# Patient Record
Sex: FEMALE | Race: OTHER | NOT HISPANIC OR LATINO | URBAN - METROPOLITAN AREA
[De-identification: names, ages, dates, MRNs, and addresses within clinical notes are randomized per-mention and may not be internally consistent; named-entity substitution may affect disease eponyms.]

---

## 2018-03-20 ENCOUNTER — EMERGENCY (EMERGENCY)
Facility: HOSPITAL | Age: 53
LOS: 1 days | Discharge: HOME | End: 2018-03-20
Attending: EMERGENCY MEDICINE
Payer: MEDICAID

## 2018-03-20 VITALS
SYSTOLIC BLOOD PRESSURE: 133 MMHG | HEART RATE: 84 BPM | OXYGEN SATURATION: 99 % | DIASTOLIC BLOOD PRESSURE: 83 MMHG | RESPIRATION RATE: 17 BRPM

## 2018-03-20 VITALS
HEIGHT: 65 IN | HEART RATE: 95 BPM | OXYGEN SATURATION: 99 % | SYSTOLIC BLOOD PRESSURE: 146 MMHG | WEIGHT: 240.08 LBS | DIASTOLIC BLOOD PRESSURE: 88 MMHG | TEMPERATURE: 97 F

## 2018-03-20 LAB
ALBUMIN SERPL ELPH-MCNC: 3 G/DL — LOW (ref 3.5–5)
ALP SERPL-CCNC: 130 U/L — HIGH (ref 40–120)
ALT FLD-CCNC: 18 U/L DA — SIGNIFICANT CHANGE UP (ref 10–60)
ANION GAP SERPL CALC-SCNC: 8 MMOL/L — SIGNIFICANT CHANGE UP (ref 5–17)
AST SERPL-CCNC: 18 U/L — SIGNIFICANT CHANGE UP (ref 10–40)
BASOPHILS # BLD AUTO: 0.1 K/UL — SIGNIFICANT CHANGE UP (ref 0–0.2)
BASOPHILS NFR BLD AUTO: 1.1 % — SIGNIFICANT CHANGE UP (ref 0–2)
BILIRUB SERPL-MCNC: 0.2 MG/DL — SIGNIFICANT CHANGE UP (ref 0.2–1.2)
BUN SERPL-MCNC: 5 MG/DL — LOW (ref 7–18)
CALCIUM SERPL-MCNC: 9.3 MG/DL — SIGNIFICANT CHANGE UP (ref 8.4–10.5)
CHLORIDE SERPL-SCNC: 107 MMOL/L — SIGNIFICANT CHANGE UP (ref 96–108)
CK SERPL-CCNC: 59 U/L — SIGNIFICANT CHANGE UP (ref 21–215)
CO2 SERPL-SCNC: 25 MMOL/L — SIGNIFICANT CHANGE UP (ref 22–31)
CREAT SERPL-MCNC: 0.67 MG/DL — SIGNIFICANT CHANGE UP (ref 0.5–1.3)
EOSINOPHIL # BLD AUTO: 0.2 K/UL — SIGNIFICANT CHANGE UP (ref 0–0.5)
EOSINOPHIL NFR BLD AUTO: 1.6 % — SIGNIFICANT CHANGE UP (ref 0–6)
GLUCOSE SERPL-MCNC: 95 MG/DL — SIGNIFICANT CHANGE UP (ref 70–99)
HCG SERPL-ACNC: 3 MIU/ML — SIGNIFICANT CHANGE UP
HCT VFR BLD CALC: 42.9 % — SIGNIFICANT CHANGE UP (ref 34.5–45)
HGB BLD-MCNC: 13.1 G/DL — SIGNIFICANT CHANGE UP (ref 11.5–15.5)
LYMPHOCYTES # BLD AUTO: 29.2 % — SIGNIFICANT CHANGE UP (ref 13–44)
LYMPHOCYTES # BLD AUTO: 3.7 K/UL — HIGH (ref 1–3.3)
MCHC RBC-ENTMCNC: 29 PG — SIGNIFICANT CHANGE UP (ref 27–34)
MCHC RBC-ENTMCNC: 30.5 GM/DL — LOW (ref 32–36)
MCV RBC AUTO: 95.2 FL — SIGNIFICANT CHANGE UP (ref 80–100)
MONOCYTES # BLD AUTO: 0.6 K/UL — SIGNIFICANT CHANGE UP (ref 0–0.9)
MONOCYTES NFR BLD AUTO: 4.4 % — SIGNIFICANT CHANGE UP (ref 2–14)
NEUTROPHILS # BLD AUTO: 8.1 K/UL — HIGH (ref 1.8–7.4)
NEUTROPHILS NFR BLD AUTO: 63.6 % — SIGNIFICANT CHANGE UP (ref 43–77)
PLATELET # BLD AUTO: 344 K/UL — SIGNIFICANT CHANGE UP (ref 150–400)
POTASSIUM SERPL-MCNC: 4.1 MMOL/L — SIGNIFICANT CHANGE UP (ref 3.5–5.3)
POTASSIUM SERPL-SCNC: 4.1 MMOL/L — SIGNIFICANT CHANGE UP (ref 3.5–5.3)
PROT SERPL-MCNC: 8.2 G/DL — SIGNIFICANT CHANGE UP (ref 6–8.3)
RBC # BLD: 4.51 M/UL — SIGNIFICANT CHANGE UP (ref 3.8–5.2)
RBC # FLD: 14.4 % — SIGNIFICANT CHANGE UP (ref 10.3–14.5)
SODIUM SERPL-SCNC: 140 MMOL/L — SIGNIFICANT CHANGE UP (ref 135–145)
TROPONIN I SERPL-MCNC: <0.015 NG/ML — SIGNIFICANT CHANGE UP (ref 0–0.04)
WBC # BLD: 12.7 K/UL — HIGH (ref 3.8–10.5)
WBC # FLD AUTO: 12.7 K/UL — HIGH (ref 3.8–10.5)

## 2018-03-20 PROCEDURE — 71046 X-RAY EXAM CHEST 2 VIEWS: CPT | Mod: 26

## 2018-03-20 PROCEDURE — 84484 ASSAY OF TROPONIN QUANT: CPT

## 2018-03-20 PROCEDURE — 93005 ELECTROCARDIOGRAM TRACING: CPT

## 2018-03-20 PROCEDURE — 70450 CT HEAD/BRAIN W/O DYE: CPT

## 2018-03-20 PROCEDURE — 85027 COMPLETE CBC AUTOMATED: CPT

## 2018-03-20 PROCEDURE — 82962 GLUCOSE BLOOD TEST: CPT

## 2018-03-20 PROCEDURE — 96375 TX/PRO/DX INJ NEW DRUG ADDON: CPT

## 2018-03-20 PROCEDURE — 99284 EMERGENCY DEPT VISIT MOD MDM: CPT | Mod: 25

## 2018-03-20 PROCEDURE — 82550 ASSAY OF CK (CPK): CPT

## 2018-03-20 PROCEDURE — 96374 THER/PROPH/DIAG INJ IV PUSH: CPT

## 2018-03-20 PROCEDURE — 70450 CT HEAD/BRAIN W/O DYE: CPT | Mod: 26

## 2018-03-20 PROCEDURE — 84702 CHORIONIC GONADOTROPIN TEST: CPT

## 2018-03-20 PROCEDURE — 71046 X-RAY EXAM CHEST 2 VIEWS: CPT

## 2018-03-20 PROCEDURE — 80053 COMPREHEN METABOLIC PANEL: CPT

## 2018-03-20 PROCEDURE — 99285 EMERGENCY DEPT VISIT HI MDM: CPT

## 2018-03-20 RX ORDER — DIPHENHYDRAMINE HCL 50 MG
25 CAPSULE ORAL ONCE
Qty: 0 | Refills: 0 | Status: COMPLETED | OUTPATIENT
Start: 2018-03-20 | End: 2018-03-20

## 2018-03-20 RX ORDER — SODIUM CHLORIDE 9 MG/ML
1000 INJECTION INTRAMUSCULAR; INTRAVENOUS; SUBCUTANEOUS ONCE
Qty: 0 | Refills: 0 | Status: COMPLETED | OUTPATIENT
Start: 2018-03-20 | End: 2018-03-20

## 2018-03-20 RX ORDER — METOCLOPRAMIDE HCL 10 MG
10 TABLET ORAL ONCE
Qty: 0 | Refills: 0 | Status: COMPLETED | OUTPATIENT
Start: 2018-03-20 | End: 2018-03-20

## 2018-03-20 RX ADMIN — SODIUM CHLORIDE 1000 MILLILITER(S): 9 INJECTION INTRAMUSCULAR; INTRAVENOUS; SUBCUTANEOUS at 13:26

## 2018-03-20 RX ADMIN — Medication 104 MILLIGRAM(S): at 13:25

## 2018-03-20 RX ADMIN — Medication 25 MILLIGRAM(S): at 13:26

## 2018-03-20 NOTE — ED PROVIDER NOTE - MEDICAL DECISION MAKING DETAILS
53 y/o F pt with dizziness and syncopal episode. Will obtain labs, CXR, EKG, and reassess. 53 y/o F pt with dizziness and syncopal episode. Will obtain labs, CXR, EKG, and reassess.  labs normal. headache resolved with treatment. now asymptomatic. home with cardiology and neurology followup. dizziness and fainting possibly prodrome to migraine. now all symptoms resolved after reglan benadryl

## 2018-03-20 NOTE — ED ADULT NURSE NOTE - OBJECTIVE STATEMENT
arrived ambulatory reports that suddenly felt dizzy and then Syncopased  denies C/P dizziness palpitation at present c/o H/A 6/10, attached to wall monitor.

## 2018-03-20 NOTE — ED PROVIDER NOTE - OBJECTIVE STATEMENT
51 y/o F pt with PMHx of anemia, asthma, breast CA, gastritis, and h/o "rapid heartbeat" presents to ED c/o lightheadedness and syncope x this morning. Pt reports, that while on her way to work, she felt lightheaded with tunnel vision, therefore, sat on the ground; and afterwards, pt woke up on the floor. Pt states that she drank coffee this morning. In ED, pt states she is felling better. Pt relays h/o cardiac work up for "rapid heartbeat" in July with nml EKG and nml echo. Pt denies fever, chills, chest pain, shortness of breath, or any other complaints.

## 2018-03-20 NOTE — ED ADULT TRIAGE NOTE - CHIEF COMPLAINT QUOTE
felt dizzy and syncopized on the street. Patient complains of headache. Patient not on blood thinner

## 2018-03-20 NOTE — ED PROVIDER NOTE - PMH
Anemia  iron-deficiency  Asthma    Breast cancer    Gastritis    Obesity    Rheumatoid arthritis    Vitamin D deficiency

## 2020-02-10 ENCOUNTER — INPATIENT (INPATIENT)
Facility: HOSPITAL | Age: 55
LOS: 3 days | Discharge: HOME | DRG: 387 | End: 2020-02-14
Attending: STUDENT IN AN ORGANIZED HEALTH CARE EDUCATION/TRAINING PROGRAM | Admitting: STUDENT IN AN ORGANIZED HEALTH CARE EDUCATION/TRAINING PROGRAM
Payer: MEDICAID

## 2020-02-10 VITALS
HEIGHT: 66 IN | SYSTOLIC BLOOD PRESSURE: 142 MMHG | RESPIRATION RATE: 18 BRPM | OXYGEN SATURATION: 98 % | TEMPERATURE: 98 F | HEART RATE: 74 BPM | WEIGHT: 240.08 LBS | DIASTOLIC BLOOD PRESSURE: 97 MMHG

## 2020-02-10 DIAGNOSIS — K52.9 NONINFECTIVE GASTROENTERITIS AND COLITIS, UNSPECIFIED: ICD-10-CM

## 2020-02-10 DIAGNOSIS — Z90.49 ACQUIRED ABSENCE OF OTHER SPECIFIED PARTS OF DIGESTIVE TRACT: Chronic | ICD-10-CM

## 2020-02-10 LAB
ALBUMIN SERPL ELPH-MCNC: 2.7 G/DL — LOW (ref 3.5–5)
ALP SERPL-CCNC: 127 U/L — HIGH (ref 40–120)
ALT FLD-CCNC: 34 U/L DA — SIGNIFICANT CHANGE UP (ref 10–60)
ANION GAP SERPL CALC-SCNC: 6 MMOL/L — SIGNIFICANT CHANGE UP (ref 5–17)
APPEARANCE UR: CLEAR — SIGNIFICANT CHANGE UP
AST SERPL-CCNC: 39 U/L — SIGNIFICANT CHANGE UP (ref 10–40)
BASOPHILS # BLD AUTO: 0.04 K/UL — SIGNIFICANT CHANGE UP (ref 0–0.2)
BASOPHILS NFR BLD AUTO: 0.4 % — SIGNIFICANT CHANGE UP (ref 0–2)
BILIRUB SERPL-MCNC: 0.3 MG/DL — SIGNIFICANT CHANGE UP (ref 0.2–1.2)
BILIRUB UR-MCNC: NEGATIVE — SIGNIFICANT CHANGE UP
BUN SERPL-MCNC: 8 MG/DL — SIGNIFICANT CHANGE UP (ref 7–18)
CALCIUM SERPL-MCNC: 8.1 MG/DL — LOW (ref 8.4–10.5)
CHLORIDE SERPL-SCNC: 108 MMOL/L — SIGNIFICANT CHANGE UP (ref 96–108)
CO2 SERPL-SCNC: 25 MMOL/L — SIGNIFICANT CHANGE UP (ref 22–31)
COLOR SPEC: YELLOW — SIGNIFICANT CHANGE UP
CREAT SERPL-MCNC: 0.78 MG/DL — SIGNIFICANT CHANGE UP (ref 0.5–1.3)
DIFF PNL FLD: ABNORMAL
EOSINOPHIL # BLD AUTO: 0.01 K/UL — SIGNIFICANT CHANGE UP (ref 0–0.5)
EOSINOPHIL NFR BLD AUTO: 0.1 % — SIGNIFICANT CHANGE UP (ref 0–6)
GLUCOSE SERPL-MCNC: 119 MG/DL — HIGH (ref 70–99)
GLUCOSE UR QL: NEGATIVE — SIGNIFICANT CHANGE UP
HCG SERPL-ACNC: 3 MIU/ML — SIGNIFICANT CHANGE UP
HCT VFR BLD CALC: 37.5 % — SIGNIFICANT CHANGE UP (ref 34.5–45)
HGB BLD-MCNC: 12.2 G/DL — SIGNIFICANT CHANGE UP (ref 11.5–15.5)
IMM GRANULOCYTES NFR BLD AUTO: 0.5 % — SIGNIFICANT CHANGE UP (ref 0–1.5)
KETONES UR-MCNC: ABNORMAL
LEUKOCYTE ESTERASE UR-ACNC: ABNORMAL
LIDOCAIN IGE QN: 50 U/L — LOW (ref 73–393)
LYMPHOCYTES # BLD AUTO: 1.06 K/UL — SIGNIFICANT CHANGE UP (ref 1–3.3)
LYMPHOCYTES # BLD AUTO: 9.6 % — LOW (ref 13–44)
MCHC RBC-ENTMCNC: 29.6 PG — SIGNIFICANT CHANGE UP (ref 27–34)
MCHC RBC-ENTMCNC: 32.5 GM/DL — SIGNIFICANT CHANGE UP (ref 32–36)
MCV RBC AUTO: 91 FL — SIGNIFICANT CHANGE UP (ref 80–100)
MONOCYTES # BLD AUTO: 1.07 K/UL — HIGH (ref 0–0.9)
MONOCYTES NFR BLD AUTO: 9.7 % — SIGNIFICANT CHANGE UP (ref 2–14)
NEUTROPHILS # BLD AUTO: 8.76 K/UL — HIGH (ref 1.8–7.4)
NEUTROPHILS NFR BLD AUTO: 79.7 % — HIGH (ref 43–77)
NITRITE UR-MCNC: NEGATIVE — SIGNIFICANT CHANGE UP
NRBC # BLD: 0 /100 WBCS — SIGNIFICANT CHANGE UP (ref 0–0)
PH UR: 6 — SIGNIFICANT CHANGE UP (ref 5–8)
PLATELET # BLD AUTO: 257 K/UL — SIGNIFICANT CHANGE UP (ref 150–400)
POTASSIUM SERPL-MCNC: 3.6 MMOL/L — SIGNIFICANT CHANGE UP (ref 3.5–5.3)
POTASSIUM SERPL-SCNC: 3.6 MMOL/L — SIGNIFICANT CHANGE UP (ref 3.5–5.3)
PROT SERPL-MCNC: 6.9 G/DL — SIGNIFICANT CHANGE UP (ref 6–8.3)
PROT UR-MCNC: 30 MG/DL
RBC # BLD: 4.12 M/UL — SIGNIFICANT CHANGE UP (ref 3.8–5.2)
RBC # FLD: 14.1 % — SIGNIFICANT CHANGE UP (ref 10.3–14.5)
SODIUM SERPL-SCNC: 139 MMOL/L — SIGNIFICANT CHANGE UP (ref 135–145)
SP GR SPEC: 1.01 — SIGNIFICANT CHANGE UP (ref 1.01–1.02)
UROBILINOGEN FLD QL: NEGATIVE — SIGNIFICANT CHANGE UP
WBC # BLD: 11 K/UL — HIGH (ref 3.8–10.5)
WBC # FLD AUTO: 11 K/UL — HIGH (ref 3.8–10.5)

## 2020-02-10 PROCEDURE — 99285 EMERGENCY DEPT VISIT HI MDM: CPT

## 2020-02-10 PROCEDURE — 74177 CT ABD & PELVIS W/CONTRAST: CPT | Mod: 26

## 2020-02-10 PROCEDURE — 99222 1ST HOSP IP/OBS MODERATE 55: CPT

## 2020-02-10 RX ORDER — FAMOTIDINE 10 MG/ML
20 INJECTION INTRAVENOUS ONCE
Refills: 0 | Status: COMPLETED | OUTPATIENT
Start: 2020-02-10 | End: 2020-02-10

## 2020-02-10 RX ORDER — ONDANSETRON 8 MG/1
4 TABLET, FILM COATED ORAL ONCE
Refills: 0 | Status: COMPLETED | OUTPATIENT
Start: 2020-02-10 | End: 2020-02-10

## 2020-02-10 RX ORDER — CIPROFLOXACIN LACTATE 400MG/40ML
400 VIAL (ML) INTRAVENOUS EVERY 12 HOURS
Refills: 0 | Status: COMPLETED | OUTPATIENT
Start: 2020-02-10 | End: 2020-02-10

## 2020-02-10 RX ORDER — SODIUM CHLORIDE 9 MG/ML
1000 INJECTION INTRAMUSCULAR; INTRAVENOUS; SUBCUTANEOUS ONCE
Refills: 0 | Status: COMPLETED | OUTPATIENT
Start: 2020-02-10 | End: 2020-02-10

## 2020-02-10 RX ORDER — METRONIDAZOLE 500 MG
500 TABLET ORAL ONCE
Refills: 0 | Status: COMPLETED | OUTPATIENT
Start: 2020-02-10 | End: 2020-02-10

## 2020-02-10 RX ORDER — KETOROLAC TROMETHAMINE 30 MG/ML
30 SYRINGE (ML) INJECTION ONCE
Refills: 0 | Status: DISCONTINUED | OUTPATIENT
Start: 2020-02-10 | End: 2020-02-10

## 2020-02-10 RX ADMIN — Medication 30 MILLIGRAM(S): at 18:53

## 2020-02-10 RX ADMIN — ONDANSETRON 4 MILLIGRAM(S): 8 TABLET, FILM COATED ORAL at 18:53

## 2020-02-10 RX ADMIN — SODIUM CHLORIDE 1000 MILLILITER(S): 9 INJECTION INTRAMUSCULAR; INTRAVENOUS; SUBCUTANEOUS at 18:53

## 2020-02-10 RX ADMIN — FAMOTIDINE 20 MILLIGRAM(S): 10 INJECTION INTRAVENOUS at 18:53

## 2020-02-10 NOTE — ED PROVIDER NOTE - CROS ED SKIN ALL NEG
Take Tamiflu twice daily for 5 days.    Use ibuprofen and Tylenol for fevers and body aches.    Return to the emergency department for any new or worsening symptoms or as needed.  
negative...

## 2020-02-10 NOTE — H&P ADULT - NSICDXPASTMEDICALHX_GEN_ALL_CORE_FT
PAST MEDICAL HISTORY:  Anemia iron-deficiency    Asthma     Breast cancer     Gastritis     Obesity     Rheumatoid arthritis     Vitamin D deficiency

## 2020-02-10 NOTE — H&P ADULT - HISTORY OF PRESENT ILLNESS
Patient is a 53yo female with PMH of HTN, tachycardia (unknown), breast cancer (in remission), gastritis, anemia, s/p cholecystectomy, presented to ER for fever, abdominal pain and diarrhea. Patient reports her symptoms started 1 week ago, initially had abdominal discomfort associated with  loose bowel movements, about 2/day which increased in frequency to 6-7/day, associated with mucus but no blood. She describes her abdominal pain as severe, episodic pain, ranging from 0-8/10 in severity, mostly in the lower quadrants, no precipitating factors, relieved by advil. She reports nausea since 3 days, associated with multiple episodes of NBNB vomiting, 5 since this AM. Patient endorses fever of 101 2 days PTA associated with chills, weakness and headache. Denies recent travel, sick contacts or eating food from outside.

## 2020-02-10 NOTE — H&P ADULT - ASSESSMENT
Patient is a 55yo female with PMH of HTN, tachycardia (unknown), breast cancer (in remission), gastritis, anemia, s/p cholecystectomy, presented to ER for fever, abdominal pain and diarrhea.   In ER, her vitals were: afebrile, bp 142/97, hr 74bpm, RR 18, spo2 98% on RA. Labs were noted with wbc count 11, alp 127, CT  a/p with Extensive colonic wall thickening, most severe in the right colon, however involving the entire colon and rectum. Appendix is also thickened which could be due to reactive inflammation versus direct infectious/inflammatory involvement.

## 2020-02-10 NOTE — ED PROVIDER NOTE - ATTENDING CONTRIBUTION TO CARE
53 y/o female with a PMH of cholecystectomy, breast cancer s/p b/l mastectomy, presents with 3 days of fever, nausea, vomiting, and severe diarrhea.  +tender on exam, worse in the LLQ.  Will check CT, supportive care and reassess.

## 2020-02-10 NOTE — H&P ADULT - NSHPPHYSICALEXAM_GEN_ALL_CORE
Vital Signs Last 24 Hrs  T(C): 36.7 (10 Feb 2020 17:28), Max: 36.7 (10 Feb 2020 17:28)  T(F): 98 (10 Feb 2020 17:28), Max: 98 (10 Feb 2020 17:28)  HR: 74 (10 Feb 2020 17:28) (74 - 74)  BP: 142/97 (10 Feb 2020 17:28) (142/97 - 142/97)  RR: 18 (10 Feb 2020 17:28) (18 - 18)  SpO2: 98% (10 Feb 2020 17:28) (98% - 98%)    PHYSICAL EXAM:  GENERAL: NAD, well-developed  HEAD:  Atraumatic, Normocephalic  EYES: EOMI, PERRLA, conjunctiva and sclera clear  NECK: Supple, No JVD  CHEST/LUNG: Clear to auscultation bilaterally; No wheeze  HEART: Regular rate and rhythm; No murmurs, rubs, or gallops  ABDOMEN: Soft, Nontender, Nondistended; Bowel sounds present  EXTREMITIES:, No clubbing, cyanosis, or edema  PSYCH: AAOx3  NEUROLOGY: non-focal  SKIN: No rashes or lesions Vital Signs Last 24 Hrs  T(C): 36.7 (10 Feb 2020 17:28), Max: 36.7 (10 Feb 2020 17:28)  T(F): 98 (10 Feb 2020 17:28), Max: 98 (10 Feb 2020 17:28)  HR: 74 (10 Feb 2020 17:28) (74 - 74)  BP: 142/97 (10 Feb 2020 17:28) (142/97 - 142/97)  RR: 18 (10 Feb 2020 17:28) (18 - 18)  SpO2: 98% (10 Feb 2020 17:28) (98% - 98%)    PHYSICAL EXAM:  GENERAL: NAD, well-developed  HEAD:  Atraumatic, Normocephalic  EYES: EOMI, PERRLA, conjunctiva and sclera clear  NECK: Supple, No JVD  CHEST/LUNG: Clear to auscultation bilaterally; No wheeze  HEART: Regular rate and rhythm; No murmurs, rubs, or gallops  ABDOMEN: Soft, tenderness in LLQ+, Nondistended; Bowel sounds present  EXTREMITIES:, No clubbing, cyanosis, or edema  PSYCH: AAOx3  NEUROLOGY: non-focal  SKIN: No rashes or lesions

## 2020-02-10 NOTE — H&P ADULT - NSICDXPASTSURGICALHX_GEN_ALL_CORE_FT
PAST SURGICAL HISTORY:  History of cholecystectomy     History of tonsillectomy     S/P bilateral breast biopsy     [place] teeth extracted PAST SURGICAL HISTORY:  History of cholecystectomy     History of tonsillectomy     S/P bilateral breast biopsy     [place] teeth extracted

## 2020-02-10 NOTE — H&P ADULT - ATTENDING COMMENTS
Pt seen and examined. Case discussed with Housestaff.  54 year old woman with PMH of HTN, persistent tachycardia or unclear origin, clinical depression who presents with 1 week of  frequent watery dark red stools, abdominal cramping, fever and vomiting. Pt seen and examined. Case discussed with Housestaff.  54 year old woman with PMH of HTN, persistent tachycardia or unclear origin, clinical depression who presents with 1 week of  frequent watery dark red stools, abdominal cramping, fever and vomiting.    Vital Signs Last 24 Hrs  T(C): 36.8 (2020 02:20), Max: 36.8 (2020 02:20)  T(F): 98.3 (2020 02:20), Max: 98.3 (2020 02:20)  HR: 88 (2020 02:20) (74 - 88)  BP: 119/70 (2020 02:20) (119/70 - 142/97)  RR: 18 (2020 02:20) (18 - 18)  SpO2: 96% (2020 02:20) (96% - 98%)    Middle aged woman, obese, NAD AAO X 3  CTA B/L; RRR S1S2 only  Soft full, tender to palpation in the suprapubic and LLQ, ND BS +  NO pedal edema    Labs                        12.2   11.00 )-----------( 257      ( 10 Feb 2020 18:35 )             37.5     02-10    139  |  108  |  8   ----------------------------<  119<H>  3.6   |  25  |  0.78    Ca    8.1<L>      10 Feb 2020 18:35    TPro  6.9  /  Alb  2.7<L>  /  TBili  0.3  /  DBili  x   /  AST  39  /  ALT  34  /  AlkPhos  127<H>  02-10    Urinalysis Basic - ( 10 Feb 2020 21:50 )    Color: Yellow / Appearance: Clear / S.010 / pH: x  Gluc: x / Ketone: Trace  / Bili: Negative / Urobili: Negative   Blood: x / Protein: 30 mg/dL / Nitrite: Negative   Leuk Esterase: Small / RBC: 0-2 /HPF / WBC 6-10 /HPF   Sq Epi: x / Non Sq Epi: Many /HPF / Bacteria: Moderate /HPF    CT abd/pelvis  Extensive colonic wall thickening, most severe in the right colon, however involving the entire colon and rectum. Appendix is also thickened which could be due to reactive inflammation versus direct infectious/inflammatory involvement.    Impression  54 year old with features suggestive of acute pan colitis, UTI  with concern for acute appendicitis based CT abdomen finding.    A/P  - Acute pancolitis to r/o acute appendicitis  - UTI   - Dehydration    Plan  Admit to Medicine  IV antibiotics - empiric with flagyl and ciprofloxacin  IV fluid hydration  Stool culture/o&p  urine cx  Surgery consult   Resume home meds  Supportive care- antiemetics/pain control

## 2020-02-10 NOTE — ED ADULT NURSE NOTE - ED STAT RN HANDOFF DETAILS 3
Report given to MEHRAN Clay. Pt resting in bed, no acute distress noted, denies chest pain, no shortness of breath indicated. Safety maintained.

## 2020-02-10 NOTE — H&P ADULT - PROBLEM SELECTOR PLAN 1
Presented with nausea, vomiting, diarrhea and fever of 102 at home  Vitals stable on presentation with LLQ abdominal tenderness on PE  -CT with pancolitis  -S/ cipro and flagyl in ER, will continue the same for now  -Will get surgery eval for possible appendicitis on CT  -F/u blood and stool studies  -Advance diet as tolerated, prn zofran for nausea and vomiting  -IV hydration overnight as pt clinically dry

## 2020-02-10 NOTE — ED PROVIDER NOTE - CLINICAL SUMMARY MEDICAL DECISION MAKING FREE TEXT BOX
53 y/o female with a PMH of cholecystectomy, breast cancer s/p b/l mastectomy, presents with 3 days of fever, nausea, vomiting, and severe diarrhea.  +tender on exam, worse in the LLQ.  Will check CT, supportive care and reassess for colitis vs diverticulitis.

## 2020-02-10 NOTE — H&P ADULT - NSICDXFAMILYHX_GEN_ALL_CORE_FT
FAMILY HISTORY:  Family history of diabetes mellitus  Family history of hypertension  Family history of MI (myocardial infarction) FAMILY HISTORY:  Family history of diabetes mellitus  Family history of hypertension  Family history of MI (myocardial infarction)  FH: breast cancer

## 2020-02-10 NOTE — ED PROVIDER NOTE - OBJECTIVE STATEMENT
Pt is a 53 y/o postmenopausal woman with a PMH of cholecystectomy, breast cancer, and amoxicillin usage 2 weeks ago that presents with 3 days of fever, nausea, vomiting, and severe diarrhea. She denies any recent travel, sick contacts, or questionable food ingestion. The patient admits that the vomiting was clear at first but then became green 2 days ago and yesterday. She says that she has had the diarrhea 6-10 times per day and she describes non-bloody, mucous covered stool. 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 She denies sexual contact in the past several years and claims to have entered menopause about 5 years ago.  She admits to mid abdominal pain as well as generalized body aches.

## 2020-02-10 NOTE — H&P ADULT - PROBLEM SELECTOR PLAN 3
IMPROVE VTE Individual Risk Assessment  RISK                                                                Points  [  ] Previous VTE                                                  3  [  ] Thrombophilia                                               2  [  ] Lower limb paralysis                                      2       (unable to hold up >15 seconds)    [  ] Current Cancer                                              2        (within 6 months)  [  ] Immobilization > 24 hrs                                1  [  ] ICU/CCU stay > 24 hours                              1  [  ] Age > 60                                                      1  IMPROVE VTE Score 0, no indication for chemoppx

## 2020-02-11 DIAGNOSIS — Z29.9 ENCOUNTER FOR PROPHYLACTIC MEASURES, UNSPECIFIED: ICD-10-CM

## 2020-02-11 DIAGNOSIS — I10 ESSENTIAL (PRIMARY) HYPERTENSION: ICD-10-CM

## 2020-02-11 DIAGNOSIS — R74.8 ABNORMAL LEVELS OF OTHER SERUM ENZYMES: ICD-10-CM

## 2020-02-11 DIAGNOSIS — K52.9 NONINFECTIVE GASTROENTERITIS AND COLITIS, UNSPECIFIED: ICD-10-CM

## 2020-02-11 DIAGNOSIS — Z71.89 OTHER SPECIFIED COUNSELING: ICD-10-CM

## 2020-02-11 DIAGNOSIS — E87.6 HYPOKALEMIA: ICD-10-CM

## 2020-02-11 DIAGNOSIS — R82.90 UNSPECIFIED ABNORMAL FINDINGS IN URINE: ICD-10-CM

## 2020-02-11 LAB
24R-OH-CALCIDIOL SERPL-MCNC: 22.9 NG/ML — LOW (ref 30–80)
ANION GAP SERPL CALC-SCNC: 6 MMOL/L — SIGNIFICANT CHANGE UP (ref 5–17)
BUN SERPL-MCNC: 8 MG/DL — SIGNIFICANT CHANGE UP (ref 7–18)
CALCIUM SERPL-MCNC: 7.8 MG/DL — LOW (ref 8.4–10.5)
CHLORIDE SERPL-SCNC: 110 MMOL/L — HIGH (ref 96–108)
CHOLEST SERPL-MCNC: 153 MG/DL — SIGNIFICANT CHANGE UP (ref 10–199)
CO2 SERPL-SCNC: 25 MMOL/L — SIGNIFICANT CHANGE UP (ref 22–31)
CREAT SERPL-MCNC: 0.61 MG/DL — SIGNIFICANT CHANGE UP (ref 0.5–1.3)
ESTIMATED AVERAGE GLUCOSE: 131 MG/DL — HIGH (ref 68–114)
FOLATE SERPL-MCNC: >20 NG/ML — SIGNIFICANT CHANGE UP
GLUCOSE SERPL-MCNC: 112 MG/DL — HIGH (ref 70–99)
HBA1C BLD-MCNC: 6.2 % — HIGH (ref 4–5.6)
HCT VFR BLD CALC: 33.6 % — LOW (ref 34.5–45)
HCV AB S/CO SERPL IA: 0.23 S/CO — SIGNIFICANT CHANGE UP (ref 0–0.99)
HCV AB SERPL-IMP: SIGNIFICANT CHANGE UP
HDLC SERPL-MCNC: 59 MG/DL — SIGNIFICANT CHANGE UP
HGB BLD-MCNC: 10.8 G/DL — LOW (ref 11.5–15.5)
LACTATE SERPL-SCNC: 0.6 MMOL/L — LOW (ref 0.7–2)
LIPID PNL WITH DIRECT LDL SERPL: 74 MG/DL — SIGNIFICANT CHANGE UP
MCHC RBC-ENTMCNC: 29.5 PG — SIGNIFICANT CHANGE UP (ref 27–34)
MCHC RBC-ENTMCNC: 32.1 GM/DL — SIGNIFICANT CHANGE UP (ref 32–36)
MCV RBC AUTO: 91.8 FL — SIGNIFICANT CHANGE UP (ref 80–100)
NRBC # BLD: 0 /100 WBCS — SIGNIFICANT CHANGE UP (ref 0–0)
PLATELET # BLD AUTO: 242 K/UL — SIGNIFICANT CHANGE UP (ref 150–400)
POTASSIUM SERPL-MCNC: 3 MMOL/L — LOW (ref 3.5–5.3)
POTASSIUM SERPL-SCNC: 3 MMOL/L — LOW (ref 3.5–5.3)
RBC # BLD: 3.66 M/UL — LOW (ref 3.8–5.2)
RBC # FLD: 14.1 % — SIGNIFICANT CHANGE UP (ref 10.3–14.5)
SODIUM SERPL-SCNC: 141 MMOL/L — SIGNIFICANT CHANGE UP (ref 135–145)
TOTAL CHOLESTEROL/HDL RATIO MEASUREMENT: 2.6 RATIO — LOW (ref 3.3–7.1)
TRIGL SERPL-MCNC: 100 MG/DL — SIGNIFICANT CHANGE UP (ref 10–149)
TSH SERPL-MCNC: 3.08 UU/ML — SIGNIFICANT CHANGE UP (ref 0.34–4.82)
VIT B12 SERPL-MCNC: 734 PG/ML — SIGNIFICANT CHANGE UP (ref 232–1245)
WBC # BLD: 8.7 K/UL — SIGNIFICANT CHANGE UP (ref 3.8–10.5)
WBC # FLD AUTO: 8.7 K/UL — SIGNIFICANT CHANGE UP (ref 3.8–10.5)

## 2020-02-11 PROCEDURE — 99233 SBSQ HOSP IP/OBS HIGH 50: CPT | Mod: GC

## 2020-02-11 PROCEDURE — 99231 SBSQ HOSP IP/OBS SF/LOW 25: CPT

## 2020-02-11 RX ORDER — ATENOLOL 25 MG/1
25 TABLET ORAL DAILY
Refills: 0 | Status: DISCONTINUED | OUTPATIENT
Start: 2020-02-11 | End: 2020-02-12

## 2020-02-11 RX ORDER — ONDANSETRON 8 MG/1
4 TABLET, FILM COATED ORAL EVERY 6 HOURS
Refills: 0 | Status: DISCONTINUED | OUTPATIENT
Start: 2020-02-11 | End: 2020-02-14

## 2020-02-11 RX ORDER — POTASSIUM CHLORIDE 20 MEQ
10 PACKET (EA) ORAL
Refills: 0 | Status: COMPLETED | OUTPATIENT
Start: 2020-02-11 | End: 2020-02-11

## 2020-02-11 RX ORDER — SODIUM CHLORIDE 9 MG/ML
1000 INJECTION, SOLUTION INTRAVENOUS
Refills: 0 | Status: DISCONTINUED | OUTPATIENT
Start: 2020-02-11 | End: 2020-02-14

## 2020-02-11 RX ORDER — ACETAMINOPHEN 500 MG
650 TABLET ORAL EVERY 6 HOURS
Refills: 0 | Status: DISCONTINUED | OUTPATIENT
Start: 2020-02-11 | End: 2020-02-12

## 2020-02-11 RX ORDER — CIPROFLOXACIN LACTATE 400MG/40ML
400 VIAL (ML) INTRAVENOUS EVERY 12 HOURS
Refills: 0 | Status: DISCONTINUED | OUTPATIENT
Start: 2020-02-11 | End: 2020-02-14

## 2020-02-11 RX ORDER — METRONIDAZOLE 500 MG
500 TABLET ORAL EVERY 8 HOURS
Refills: 0 | Status: DISCONTINUED | OUTPATIENT
Start: 2020-02-11 | End: 2020-02-14

## 2020-02-11 RX ADMIN — ATENOLOL 25 MILLIGRAM(S): 25 TABLET ORAL at 05:46

## 2020-02-11 RX ADMIN — Medication 100 MILLIGRAM(S): at 04:07

## 2020-02-11 RX ADMIN — Medication 100 MILLIEQUIVALENT(S): at 08:20

## 2020-02-11 RX ADMIN — SODIUM CHLORIDE 1000 MILLILITER(S): 9 INJECTION INTRAMUSCULAR; INTRAVENOUS; SUBCUTANEOUS at 06:33

## 2020-02-11 RX ADMIN — ONDANSETRON 4 MILLIGRAM(S): 8 TABLET, FILM COATED ORAL at 16:49

## 2020-02-11 RX ADMIN — Medication 30 MILLIGRAM(S): at 04:06

## 2020-02-11 RX ADMIN — Medication 200 MILLIGRAM(S): at 04:07

## 2020-02-11 RX ADMIN — Medication 200 MILLIGRAM(S): at 16:48

## 2020-02-11 RX ADMIN — SODIUM CHLORIDE 75 MILLILITER(S): 9 INJECTION, SOLUTION INTRAVENOUS at 05:48

## 2020-02-11 RX ADMIN — Medication 100 MILLIEQUIVALENT(S): at 12:38

## 2020-02-11 RX ADMIN — Medication 100 MILLIGRAM(S): at 14:36

## 2020-02-11 RX ADMIN — Medication 100 MILLIEQUIVALENT(S): at 09:21

## 2020-02-11 NOTE — PROGRESS NOTE ADULT - SUBJECTIVE AND OBJECTIVE BOX
Patient is a 54y old  Female who presents with a chief complaint of nausea, vomiting, diarrhea, abdominal pain (2020 11:50)    Today  Patient reports abd pain intensity and frequency has improved  Diarrhea 3times from this morning  Denies vomiting but complains nausea     REVIEW OF SYSTEMS: denies fever, chills, SOB, palpitations, chest pain, dizziness    MEDICATIONS  (STANDING):  ATENolol  Tablet 25 milliGRAM(s) Oral daily  ciprofloxacin   IVPB 400 milliGRAM(s) IV Intermittent every 12 hours  lactated ringers. 1000 milliLiter(s) (75 mL/Hr) IV Continuous <Continuous>  metroNIDAZOLE  IVPB 500 milliGRAM(s) IV Intermittent every 8 hours    MEDICATIONS  (PRN):  acetaminophen   Tablet .. 650 milliGRAM(s) Oral every 6 hours PRN Mild Pain (1 - 3)  ondansetron Injectable 4 milliGRAM(s) IV Push every 6 hours PRN Nausea and/or Vomiting      PHYSICAL EXAM:  GENERAL: NAD, obese  NERVOUS SYSTEM:  Alert & Oriented X3, Good concentration; Motor Strength 5/5 B/L upper and lower extremities  CHEST/LUNG: Clear to auscultation bilaterally; No rales, rhonchi, wheezing, or rubs  HEART: Regular rate and rhythm; No murmurs, rubs, or gallops  ABDOMEN: Soft, tender in right hypochondriac and supra pubic region, no signs of guarding  EXTREMITIES:  2+ Peripheral Pulses, No clubbing, cyanosis, or edema    LABS:                        10.8   8.70  )-----------( 242      ( 2020 06:15 )             33.6       141  |  110<H>  |  8   ----------------------------<  112<H>  3.0<L>   |  25  |  0.61    Ca    7.8<L>      2020 06:15    TPro  6.9  /  Alb  2.7<L>  /  TBili  0.3  /  DBili  x   /  AST  39  /  ALT  34  /  AlkPhos  127<H>  02-10      Urinalysis Basic - ( 10 Feb 2020 21:50 )    Color: Yellow / Appearance: Clear / S.010 / pH: x  Gluc: x / Ketone: Trace  / Bili: Negative / Urobili: Negative   Blood: x / Protein: 30 mg/dL / Nitrite: Negative   Leuk Esterase: Small / RBC: 0-2 /HPF / WBC 6-10 /HPF   Sq Epi: x / Non Sq Epi: Many /HPF / Bacteria: Moderate /HPF

## 2020-02-11 NOTE — CONSULT NOTE ADULT - ASSESSMENT
54 year old female with pancolitis, appendiceal inflammation likely reactive. Hypokalemia    - continue NPO, IVF  - serial abdominal exams  - continue IV antibiotics  - potassium replaced by medical team   - will follow, no surgical intervention planned at this time  - discussed with Dr. Armando

## 2020-02-11 NOTE — CONSULT NOTE ADULT - SUBJECTIVE AND OBJECTIVE BOX
HPI:  55yo female with PMH of HTN, tachycardia, breast cancer (in remission), gastritis, anemia, PSH laparoscopic cholecystectomy and reconstructive breast surgery, presented to ER for fever, abdominal pain and diarrhea. Patient reports her symptoms started Friday, initially had abdominal discomfort associated with loose bowel movements, about 2/day which increased in frequency to 6-7/day, associated with mucus but no blood. She describes her abdominal pain as severe, intermittent, ranging from 0-8/10 in severity, mostly in the lower quadrants, no precipitating factors, relieved by advil. She reports nausea since 3 days, associated with multiple episodes of NBNB vomiting multiple times over the weekend. Patient endorses fever of 102 on Saturday associated with chills, weakness and headache. Denies recent travel, sick contacts or eating poorly prepared meals. Denies chest pain, shortness of breath, dysuria.     PAST MEDICAL & SURGICAL HISTORY:  Gastritis  Breast cancer  Obesity  Rheumatoid arthritis  Vitamin D deficiency  Anemia: iron-deficiency  Asthma  History of cholecystectomy  [place] teeth extracted  S/P bilateral breast biopsy  History of tonsillectomy    Review of Systems: Contained within HPI    MEDICATIONS  (STANDING):  ATENolol  Tablet 25 milliGRAM(s) Oral daily  ciprofloxacin   IVPB 400 milliGRAM(s) IV Intermittent every 12 hours  lactated ringers. 1000 milliLiter(s) (75 mL/Hr) IV Continuous <Continuous>  metroNIDAZOLE  IVPB 500 milliGRAM(s) IV Intermittent every 8 hours  potassium chloride  10 mEq/100 mL IVPB 10 milliEquivalent(s) IV Intermittent every 1 hour    MEDICATIONS  (PRN):  acetaminophen   Tablet .. 650 milliGRAM(s) Oral every 6 hours PRN Mild Pain (1 - 3)  ondansetron Injectable 4 milliGRAM(s) IV Push every 6 hours PRN Nausea and/or Vomiting    Allergies:  honey (Short breath)  No Known Drug Allergies    SOCIAL HISTORY: Denies smoking, drug and ETOH abuse    FAMILY HISTORY:  FH: breast cancer  Family history of MI (myocardial infarction)  Family history of hypertension  Family history of diabetes mellitus    Vital Signs Last 24 Hrs  T(C): 37.1 (2020 07:48), Max: 37.1 (2020 07:48)  T(F): 98.8 (2020 07:48), Max: 98.8 (2020 07:48)  HR: 75 (2020 07:48) (74 - 88)  BP: 108/67 (2020 07:48) (108/67 - 142/97)  RR: 18 (2020 07:48) (18 - 18)  SpO2: 95% (2020 07:48) (95% - 98%)    Physical Exam:    General:  Appears stated age, well-groomed, well-nourished, no distress  Eyes: EOMI  HENT:  WNL, no JVD  Chest: respirations nonlabored  Abdomen: soft, + tenderness to palpation in RLQ, rebound tenderness and rovsings sign. Negative psoas or obturator signs. Skin with soft tissue changes over lower abdominal scar, +erythema with small bullae  Extremities: no edema bilaterally  Skin: warm and dry  Musculoskeletal: no calf tenderness  Neuro:  Alert, oriented to time, place and person   Psych: normal affect    LABS:                        10.8   8.70  )-----------( 242      ( 2020 06:15 )             33.6     02-11    141  |  110<H>  |  8   ----------------------------<  112<H>  3.0<L>   |  25  |  0.61    Ca    7.8<L>      2020 06:15    TPro  6.9  /  Alb  2.7<L>  /  TBili  0.3  /  DBili  x   /  AST  39  /  ALT  34  /  AlkPhos  127<H>  02-10    Urinalysis Basic - ( 10 Feb 2020 21:50 )    Color: Yellow / Appearance: Clear / S.010 / pH: x  Gluc: x / Ketone: Trace  / Bili: Negative / Urobili: Negative   Blood: x / Protein: 30 mg/dL / Nitrite: Negative   Leuk Esterase: Small / RBC: 0-2 /HPF / WBC 6-10 /HPF   Sq Epi: x / Non Sq Epi: Many /HPF / Bacteria: Moderate /HPF    RADIOLOGY & ADDITIONAL STUDIES:  < from: CT Abdomen and Pelvis w/ IV Cont (02.10.20 @ 19:49) >  EXAM:  CT ABDOMEN AND PELVIS IC                            PROCEDURE DATE:  02/10/2020          INTERPRETATION:  CLINICAL INFORMATION: Lower abdominal pain. Rule out colitis versus diverticulitis.    COMPARISON: August 15, 2015    PROCEDURE:   CT of the Abdomen and Pelvis was performed with intravenous contrast.   Intravenous contrast: 90 ml Omnipaque 350. 10 ml discarded.  Oral contrast: None.  Sagittal and coronal reformats were performed.    FINDINGS:    LOWER CHEST: Cardiomegaly. Status post bilateral mastectomy and breast reconstruction. Thin-walled cyst at the left lung base.    LIVER: Within normal limits.  BILE DUCTS: Normal caliber.  GALLBLADDER: Cholecystectomy.  SPLEEN: Within normal limits.  PANCREAS: Within normal limits.  ADRENALS: Within normal limits.  KIDNEYS/URETERS: Within normal limits.    BLADDER: Within normal limits.  REPRODUCTIVE ORGANS: Uterus and adnexa within normal limits.    BOWEL: Extensive colonic wall thickening, most severe in the right colon, however involving the entire colon and rectum. Appendix is also thickened which could be due to reactive inflammation versus direct infectious/inflammatory involvement.  PERITONEUM: No ascites.  VESSELS: Within normal limits.  RETROPERITONEUM/LYMPH NODES: No lymphadenopathy.    ABDOMINAL WALL: Postsurgical changes in the ventral abdominal wall.  BONES: Within normal limits.    IMPRESSION:     Pancolitis, most likely on an infectious or inflammatory basis.     HARINI TORREZ M.D., ATTENDING RADIOLOGIST  This document has been electronically signed. Feb 10 2020  7:58PM    < end of copied text >

## 2020-02-11 NOTE — PATIENT PROFILE ADULT - NSASFUNCLEVELADLTRANSFER_GEN_A_NUR
Nursing Note by Rayo Whelan RN at 10/17/17 08:39 AM     Author:  Rayo Whelan RN Service:  (none) Author Type:  Registered Nurse     Filed:  10/17/17 08:40 AM Encounter Date:  10/17/2017 Status:  Signed     :  Rayo Whelan Interfaith Medical CenterIN McKenzie Memorial Hospital FV  [facility]  8:35 AM in [facility]  Match done based on reference date of today 10/17/17  Veroniquecolleen Petar Josiah 
0 = independent

## 2020-02-11 NOTE — PROGRESS NOTE ADULT - PROBLEM SELECTOR PLAN 1
acute pancolitis  has mid abdominal pain/tenderness  con't cipro, flagyl  awaiting surgery input  NPO  prn zofran   con't IVF

## 2020-02-11 NOTE — PROGRESS NOTE ADULT - SUBJECTIVE AND OBJECTIVE BOX
Chart reviewed.  Patient seen and examined.    CC:    HPI: 50 year old Woman with hx of HTN, tachycardia (unknown), breast cancer (in remission), gastritis, anemia, s/p cholecystectomy, presented to from home to the ED with c/o ~2 day hx of fever, abdominal pain and diarrhea.   In ED:  WBC 11, alk phos 127 afebrile, /97, hr 74bpm, RR 18, spo2 98% on RA. CT a/p with Extensive colonic wall thickening, most severe in the right colon, however involving the entire colon and rectum. Appendix is also thickened which could be due to reactive inflammation versus direct infectious/inflammatory involvement.    Subjective/ROS: denies n/v, abd pain "comes and goes" none at present; Denies CP/palpitation/SOB/HA/dizziness/f/c    MEDICATIONS  (STANDING):  ATENolol  Tablet 25 milliGRAM(s) Oral daily  ciprofloxacin   IVPB 400 milliGRAM(s) IV Intermittent every 12 hours  lactated ringers. 1000 milliLiter(s) (75 mL/Hr) IV Continuous <Continuous>  metroNIDAZOLE  IVPB 500 milliGRAM(s) IV Intermittent every 8 hours    MEDICATIONS  (PRN):  acetaminophen   Tablet .. 650 milliGRAM(s) Oral every 6 hours PRN Mild Pain (1 - 3)  ondansetron Injectable 4 milliGRAM(s) IV Push every 6 hours PRN Nausea and/or Vomiting      VITALS:  Vital Signs Last 24 Hrs  T(C): 37.1 (2020 07:48), Max: 37.1 (2020 07:48)  T(F): 98.8 (2020 07:48), Max: 98.8 (2020 07:48)  HR: 75 (2020 07:48) (74 - 88)  BP: 108/67 (2020 07:48) (108/67 - 142/97)  BP(mean): --  RR: 18 (2020 07:48) (18 - 18)  SpO2: 95% (2020 07:48) (95% - 98%)    PHYSICAL EXAM:    HEENT:  pupils equal and reactive, EOMI, no oropharyngeal lesions, erythema, exudates, oral thrush    NECK:   supple, no carotid bruits, no palpable lymph nodes, no thyromegaly    CV:  +S1, +S2, regular, no murmurs or rubs    RESP:   lungs clear to auscultation bilaterally, no wheezing, rales, rhonchi, good air entry bilaterally    BREAST:  not examined    GI:  abdomen soft, non-tender, non-distended, normal BS, no bruits, no abdominal masses, no palpable masses    RECTAL:  not examined    :  not examined    MSK:   normal muscle tone, no atrophy, no rigidity, no contractions    EXT:   no clubbing, no cyanosis, no edema, no calf pain, swelling or erythema    VASCULAR:  pulses equal and symmetric in the upper and lower extremities    NEURO:  AAOX3, no focal neurological deficits, follows all commands, able to move extremities spontaneously    SKIN:  no ulcers, lesions or rashes      LABS:                        10.8   8.70  )-----------( 242      ( 2020 06:15 )             33.6         141  |  110<H>  |  8   ----------------------------<  112<H>  3.0<L>   |  25  |  0.61    Ca    7.8<L>      2020 06:15    TPro  6.9  /  Alb  2.7<L>  /  TBili  0.3  /  DBili  x   /  AST  39  /  ALT  34  /  AlkPhos  127<H>  0210    LIVER FUNCTIONS - ( 10 Feb 2020 18:35 )  Alb: 2.7 g/dL / Pro: 6.9 g/dL / ALK PHOS: 127 U/L / ALT: 34 U/L DA / AST: 39 U/L / GGT: x           Urinalysis Basic - ( 10 Feb 2020 21:50 )    Color: Yellow / Appearance: Clear / S.010 / pH: x  Gluc: x / Ketone: Trace  / Bili: Negative / Urobili: Negative   Blood: x / Protein: 30 mg/dL / Nitrite: Negative   Leuk Esterase: Small / RBC: 0-2 /HPF / WBC 6-10 /HPF   Sq Epi: x / Non Sq Epi: Many /HPF / Bacteria: Moderate /HPF    Lactate, Blood: 0.6 mmol/L ( @ 12:53)    Blood, Urine: Trace (02-10 @ 21:50)    PERTINENT DIAGNOSTICS  < from: CT Abdomen and Pelvis w/ IV Cont (02.10.20 @ 19:49) >    IMPRESSION:     Pancolitis, most likely on an infectious or inflammatory basis.     < end of copied text > Chart reviewed.  Patient seen and examined.    CC: abd pain    HPI: 50 year old Woman with hx of HTN, tachycardia (unknown), breast cancer (in remission), gastritis, anemia, s/p cholecystectomy, presented to from home to the ED with c/o ~2 day hx of fever, abdominal pain and diarrhea.   In ED:  WBC 11, alk phos 127 afebrile, /97, hr 74bpm, RR 18, spo2 98% on RA. CT a/p with Extensive colonic wall thickening, most severe in the right colon, however involving the entire colon and rectum. Appendix is also thickened which could be due to reactive inflammation versus direct infectious/inflammatory involvement.    Subjective/ROS: denies n/v, abd pain "comes and goes" none at present; Denies CP/palpitation/SOB/HA/dizziness/f/c; denies urinary symptoms    MEDICATIONS  (STANDING):  ATENolol  Tablet 25 milliGRAM(s) Oral daily  ciprofloxacin   IVPB 400 milliGRAM(s) IV Intermittent every 12 hours  lactated ringers. 1000 milliLiter(s) (75 mL/Hr) IV Continuous <Continuous>  metroNIDAZOLE  IVPB 500 milliGRAM(s) IV Intermittent every 8 hours    MEDICATIONS  (PRN):  acetaminophen   Tablet .. 650 milliGRAM(s) Oral every 6 hours PRN Mild Pain (1 - 3)  ondansetron Injectable 4 milliGRAM(s) IV Push every 6 hours PRN Nausea and/or Vomiting      VITALS:  Vital Signs Last 24 Hrs  T(C): 37.1 (2020 07:48), Max: 37.1 (2020 07:48)  T(F): 98.8 (2020 07:48), Max: 98.8 (2020 07:48)  HR: 75 (2020 07:48) (74 - 88)  BP: 108/67 (2020 07:48) (108/67 - 142/97)  BP(mean): --  RR: 18 (2020 07:48) (18 - 18)  SpO2: 95% (2020 07:48) (95% - 98%)    PHYSICAL EXAM:    HEENT:  pupils equal and reactive, EOMI, no oropharyngeal lesions, erythema, exudates, oral thrush    NECK:   supple, no carotid bruits, no palpable lymph nodes, no thyromegaly    CV:  +S1, +S2, regular, no murmurs or rubs    RESP:   lungs clear to auscultation bilaterally, no wheezing, rales, rhonchi, good air entry bilaterally    BREAST:  not examined    GI:  abdomen soft, non-tender, non-distended, normal BS, no bruits, no abdominal masses, no palpable masses    RECTAL:  not examined    :  not examined    MSK:   normal muscle tone, no atrophy, no rigidity, no contractions    EXT:   no clubbing, no cyanosis, no edema, no calf pain, swelling or erythema    VASCULAR:  pulses equal and symmetric in the upper and lower extremities    NEURO:  AAOX3, no focal neurological deficits, follows all commands, able to move extremities spontaneously    SKIN:  no ulcers, lesions or rashes      LABS:                        10.8   8.70  )-----------( 242      ( 2020 06:15 )             33.6         141  |  110<H>  |  8   ----------------------------<  112<H>  3.0<L>   |  25  |  0.61    Ca    7.8<L>      2020 06:15    TPro  6.9  /  Alb  2.7<L>  /  TBili  0.3  /  DBili  x   /  AST  39  /  ALT  34  /  AlkPhos  127<H>  02-10    LIVER FUNCTIONS - ( 10 Feb 2020 18:35 )  Alb: 2.7 g/dL / Pro: 6.9 g/dL / ALK PHOS: 127 U/L / ALT: 34 U/L DA / AST: 39 U/L / GGT: x           Urinalysis Basic - ( 10 Feb 2020 21:50 )    Color: Yellow / Appearance: Clear / S.010 / pH: x  Gluc: x / Ketone: Trace  / Bili: Negative / Urobili: Negative   Blood: x / Protein: 30 mg/dL / Nitrite: Negative   Leuk Esterase: Small / RBC: 0-2 /HPF / WBC 6-10 /HPF   Sq Epi: x / Non Sq Epi: Many /HPF / Bacteria: Moderate /HPF    Lactate, Blood: 0.6 mmol/L ( @ 12:53)    Blood, Urine: Trace (02-10 @ 21:50)    PERTINENT DIAGNOSTICS  < from: CT Abdomen and Pelvis w/ IV Cont (02.10.20 @ 19:49) >    IMPRESSION:     Pancolitis, most likely on an infectious or inflammatory basis.     < end of copied text >

## 2020-02-11 NOTE — PROGRESS NOTE ADULT - ASSESSMENT
1. Pancolitis  NPO  IVF  Cont Cipro and Flagyl   Surgery consult appreciated   Follow up Stool studies including C. Diff   Denies urinary symptoms   Lipase elevated due to Colitis     2. HTN  Cont Atenolol     3. H/o breast cancer s/p surgery   Stable

## 2020-02-11 NOTE — CONSULT NOTE ADULT - ATTENDING COMMENTS
pt seen and examined  Obese abdomen and softly distended  Presently no surgical indication and will f/u

## 2020-02-12 DIAGNOSIS — E55.9 VITAMIN D DEFICIENCY, UNSPECIFIED: ICD-10-CM

## 2020-02-12 DIAGNOSIS — Z02.9 ENCOUNTER FOR ADMINISTRATIVE EXAMINATIONS, UNSPECIFIED: ICD-10-CM

## 2020-02-12 LAB
ALBUMIN SERPL ELPH-MCNC: 2.5 G/DL — LOW (ref 3.5–5)
ALP SERPL-CCNC: 109 U/L — SIGNIFICANT CHANGE UP (ref 40–120)
ALT FLD-CCNC: 31 U/L DA — SIGNIFICANT CHANGE UP (ref 10–60)
ANION GAP SERPL CALC-SCNC: 6 MMOL/L — SIGNIFICANT CHANGE UP (ref 5–17)
ANION GAP SERPL CALC-SCNC: 6 MMOL/L — SIGNIFICANT CHANGE UP (ref 5–17)
AST SERPL-CCNC: 27 U/L — SIGNIFICANT CHANGE UP (ref 10–40)
BILIRUB SERPL-MCNC: 0.2 MG/DL — SIGNIFICANT CHANGE UP (ref 0.2–1.2)
BUN SERPL-MCNC: 4 MG/DL — LOW (ref 7–18)
BUN SERPL-MCNC: 5 MG/DL — LOW (ref 7–18)
C DIFF BY PCR RESULT: SIGNIFICANT CHANGE UP
C DIFF TOX GENS STL QL NAA+PROBE: SIGNIFICANT CHANGE UP
CALCIUM SERPL-MCNC: 7.8 MG/DL — LOW (ref 8.4–10.5)
CALCIUM SERPL-MCNC: 8.3 MG/DL — LOW (ref 8.4–10.5)
CHLORIDE SERPL-SCNC: 113 MMOL/L — HIGH (ref 96–108)
CHLORIDE SERPL-SCNC: 113 MMOL/L — HIGH (ref 96–108)
CHOLEST SERPL-MCNC: 155 MG/DL — SIGNIFICANT CHANGE UP (ref 10–199)
CO2 SERPL-SCNC: 24 MMOL/L — SIGNIFICANT CHANGE UP (ref 22–31)
CO2 SERPL-SCNC: 25 MMOL/L — SIGNIFICANT CHANGE UP (ref 22–31)
CREAT SERPL-MCNC: 0.61 MG/DL — SIGNIFICANT CHANGE UP (ref 0.5–1.3)
CREAT SERPL-MCNC: 0.63 MG/DL — SIGNIFICANT CHANGE UP (ref 0.5–1.3)
CULTURE RESULTS: SIGNIFICANT CHANGE UP
GLUCOSE SERPL-MCNC: 119 MG/DL — HIGH (ref 70–99)
GLUCOSE SERPL-MCNC: 91 MG/DL — SIGNIFICANT CHANGE UP (ref 70–99)
HCT VFR BLD CALC: 35.5 % — SIGNIFICANT CHANGE UP (ref 34.5–45)
HDLC SERPL-MCNC: 56 MG/DL — SIGNIFICANT CHANGE UP
HGB BLD-MCNC: 11.2 G/DL — LOW (ref 11.5–15.5)
LIPID PNL WITH DIRECT LDL SERPL: 82 MG/DL — SIGNIFICANT CHANGE UP
MAGNESIUM SERPL-MCNC: 2.3 MG/DL — SIGNIFICANT CHANGE UP (ref 1.6–2.6)
MCHC RBC-ENTMCNC: 29 PG — SIGNIFICANT CHANGE UP (ref 27–34)
MCHC RBC-ENTMCNC: 31.5 GM/DL — LOW (ref 32–36)
MCV RBC AUTO: 92 FL — SIGNIFICANT CHANGE UP (ref 80–100)
NRBC # BLD: 0 /100 WBCS — SIGNIFICANT CHANGE UP (ref 0–0)
PLATELET # BLD AUTO: 287 K/UL — SIGNIFICANT CHANGE UP (ref 150–400)
POTASSIUM SERPL-MCNC: 3.4 MMOL/L — LOW (ref 3.5–5.3)
POTASSIUM SERPL-MCNC: 3.5 MMOL/L — SIGNIFICANT CHANGE UP (ref 3.5–5.3)
POTASSIUM SERPL-SCNC: 3.4 MMOL/L — LOW (ref 3.5–5.3)
POTASSIUM SERPL-SCNC: 3.5 MMOL/L — SIGNIFICANT CHANGE UP (ref 3.5–5.3)
PROT SERPL-MCNC: 6.5 G/DL — SIGNIFICANT CHANGE UP (ref 6–8.3)
RBC # BLD: 3.86 M/UL — SIGNIFICANT CHANGE UP (ref 3.8–5.2)
RBC # FLD: 14.4 % — SIGNIFICANT CHANGE UP (ref 10.3–14.5)
SODIUM SERPL-SCNC: 143 MMOL/L — SIGNIFICANT CHANGE UP (ref 135–145)
SODIUM SERPL-SCNC: 144 MMOL/L — SIGNIFICANT CHANGE UP (ref 135–145)
SPECIMEN SOURCE: SIGNIFICANT CHANGE UP
TOTAL CHOLESTEROL/HDL RATIO MEASUREMENT: 2.8 RATIO — LOW (ref 3.3–7.1)
TRIGL SERPL-MCNC: 84 MG/DL — SIGNIFICANT CHANGE UP (ref 10–149)
WBC # BLD: 8.97 K/UL — SIGNIFICANT CHANGE UP (ref 3.8–10.5)
WBC # FLD AUTO: 8.97 K/UL — SIGNIFICANT CHANGE UP (ref 3.8–10.5)

## 2020-02-12 PROCEDURE — 99233 SBSQ HOSP IP/OBS HIGH 50: CPT | Mod: GC

## 2020-02-12 PROCEDURE — 99232 SBSQ HOSP IP/OBS MODERATE 35: CPT

## 2020-02-12 RX ORDER — DEXTROSE MONOHYDRATE, SODIUM CHLORIDE, AND POTASSIUM CHLORIDE 50; .745; 4.5 G/1000ML; G/1000ML; G/1000ML
1000 INJECTION, SOLUTION INTRAVENOUS
Refills: 0 | Status: DISCONTINUED | OUTPATIENT
Start: 2020-02-12 | End: 2020-02-14

## 2020-02-12 RX ORDER — POTASSIUM CHLORIDE 20 MEQ
10 PACKET (EA) ORAL ONCE
Refills: 0 | Status: COMPLETED | OUTPATIENT
Start: 2020-02-12 | End: 2020-02-12

## 2020-02-12 RX ORDER — KETOROLAC TROMETHAMINE 30 MG/ML
15 SYRINGE (ML) INJECTION EVERY 6 HOURS
Refills: 0 | Status: DISCONTINUED | OUTPATIENT
Start: 2020-02-12 | End: 2020-02-13

## 2020-02-12 RX ORDER — KETOROLAC TROMETHAMINE 30 MG/ML
30 SYRINGE (ML) INJECTION EVERY 6 HOURS
Refills: 0 | Status: DISCONTINUED | OUTPATIENT
Start: 2020-02-12 | End: 2020-02-13

## 2020-02-12 RX ORDER — POTASSIUM CHLORIDE 20 MEQ
10 PACKET (EA) ORAL
Refills: 0 | Status: COMPLETED | OUTPATIENT
Start: 2020-02-12 | End: 2020-02-12

## 2020-02-12 RX ADMIN — Medication 100 MILLIGRAM(S): at 14:30

## 2020-02-12 RX ADMIN — Medication 15 MILLIGRAM(S): at 21:42

## 2020-02-12 RX ADMIN — Medication 100 MILLIEQUIVALENT(S): at 15:33

## 2020-02-12 RX ADMIN — DEXTROSE MONOHYDRATE, SODIUM CHLORIDE, AND POTASSIUM CHLORIDE 75 MILLILITER(S): 50; .745; 4.5 INJECTION, SOLUTION INTRAVENOUS at 19:01

## 2020-02-12 RX ADMIN — ATENOLOL 25 MILLIGRAM(S): 25 TABLET ORAL at 06:44

## 2020-02-12 RX ADMIN — Medication 200 MILLIGRAM(S): at 18:15

## 2020-02-12 RX ADMIN — Medication 100 MILLIGRAM(S): at 01:54

## 2020-02-12 RX ADMIN — Medication 15 MILLIGRAM(S): at 22:12

## 2020-02-12 RX ADMIN — Medication 30 MILLIGRAM(S): at 14:30

## 2020-02-12 RX ADMIN — Medication 100 MILLIEQUIVALENT(S): at 23:33

## 2020-02-12 RX ADMIN — Medication 100 MILLIEQUIVALENT(S): at 14:30

## 2020-02-12 RX ADMIN — Medication 100 MILLIEQUIVALENT(S): at 13:30

## 2020-02-12 RX ADMIN — Medication 30 MILLIGRAM(S): at 15:31

## 2020-02-12 RX ADMIN — Medication 100 MILLIGRAM(S): at 21:43

## 2020-02-12 RX ADMIN — Medication 200 MILLIGRAM(S): at 06:43

## 2020-02-12 NOTE — PROGRESS NOTE ADULT - PROBLEM SELECTOR PLAN 1
CT noted above  WBC 8.97  Continue with Cipro & Flagyl (Day 2)  Start pain management  No surgical interventions at this time  Continue with NPO  Continue with PRN Zofran

## 2020-02-12 NOTE — PROGRESS NOTE ADULT - ASSESSMENT
55 yo F with pancolitis and dilated appendix likely secondary to pancolitis. C. Difficile negative  1. NO surgical intervention is warranted  2. Cont antibiotics  3. IV fluids while NPO  4. Will d/w Dr. Armando

## 2020-02-12 NOTE — PROGRESS NOTE ADULT - ASSESSMENT
1. Pancolitis  Will advance diet to clear liquid diet   IVF  Cont Cipro and Flagyl   Surgery consult appreciated   C. Diff neg  Denies urinary symptoms   Lipase elevated due to Colitis     2. HTN  Cont Atenolol     3. H/o breast cancer s/p surgery   Stable

## 2020-02-12 NOTE — ED ADULT NURSE REASSESSMENT NOTE - NS ED NURSE REASSESS COMMENT FT1
Pt AOX3. Admitted for colitis. Complaining of lower abdominal pain 6/10. States having 2 BM today watery. OOB independently. Denies SOB or chest pain. Rt hand 22" saline lock intact with no sign of infiltration or redness. NPO except meds. Contact precautions maintained for r/o c. diff. Pt was educated on contact precautions for self and family. Will continue to monitor.
Pt resting in bed, contact precautions D/C'd. No acute distress noted, denies chest pain, no shortness of breath indicated. Safety maintained.
1130: Pt remains stable, AOX4, no s/s of any distress noted. IV line in place, IV fluids infusing as per orders, no signs of infiltration noted. NPO maintained.  VS WNL. Call bell in reach, bed in lowest position. Safety maintained, hourly rounding performed. Endorsed to nurse Calabrese.

## 2020-02-12 NOTE — PROGRESS NOTE ADULT - SUBJECTIVE AND OBJECTIVE BOX
Pt seen at bedside  Patient is a 54y old  Female who presents with a chief complaint of nausea, vomiting, diarrhea, abdominal pain (12 Feb 2020 11:58)      INTERVAL HPI/OVERNIGHT EVENTS:  PT states feels better  Has abdominal pain but is slightly improved  + diarrhea  Denies fever/chills    Vital Signs Last 24 Hrs  T(C): 37.1 (12 Feb 2020 13:04), Max: 37.1 (12 Feb 2020 13:04)  T(F): 98.8 (12 Feb 2020 13:04), Max: 98.8 (12 Feb 2020 13:04)  HR: 66 (12 Feb 2020 13:04) (60 - 74)  BP: 114/56 (12 Feb 2020 13:04) (99/65 - 114/56)  BP(mean): --  RR: 18 (12 Feb 2020 13:04) (18 - 18)  SpO2: 99% (12 Feb 2020 13:04) (96% - 99%)    Physical Exam:    Gen: awake, alert oriented NAD  HEENT: anicteric  Abd: obese, soft, + tenderness lower mid abdomen, previous RLQ tenderness now resolved. No tenderness LLQ    MEDICATIONS  (STANDING):  ciprofloxacin   IVPB 400 milliGRAM(s) IV Intermittent every 12 hours  dextrose 5% + sodium chloride 0.9% with potassium chloride 20 mEq/L 1000 milliLiter(s) (75 mL/Hr) IV Continuous <Continuous>  lactated ringers. 1000 milliLiter(s) (75 mL/Hr) IV Continuous <Continuous>  metroNIDAZOLE  IVPB 500 milliGRAM(s) IV Intermittent every 8 hours  potassium chloride  10 mEq/100 mL IVPB 10 milliEquivalent(s) IV Intermittent once    MEDICATIONS  (PRN):  ketorolac   Injectable 15 milliGRAM(s) IV Push every 6 hours PRN Mild Pain (1 - 3)  ketorolac   Injectable 30 milliGRAM(s) IV Push every 6 hours PRN Moderate Pain (4 - 6)  ondansetron Injectable 4 milliGRAM(s) IV Push every 6 hours PRN Nausea and/or Vomiting                            11.2   8.97  )-----------( 287      ( 12 Feb 2020 06:22 )             35.5     02-12    144  |  113<H>  |  4<L>  ----------------------------<  91  3.4<L>   |  25  |  0.63    Ca    8.3<L>      12 Feb 2020 06:22  Mg     2.3     02-12    TPro  6.5  /  Alb  2.5<L>  /  TBili  0.2  /  DBili  x   /  AST  27  /  ALT  31  /  AlkPhos  109  02-12    Clostridium difficile Toxin by PCR (02.12.20 @ 06:25)    Clostridium difficile Toxin by PCR: The results of this test should be interpreted with consideration of all  clinical and laboratory findings. This test determines the presence of  the C. difficile tcdB gene at a given time and is not intended to  identify antibiotic associated disease or C. difficile infection without  clinical context.  Successful treatment is based on the resolution of clinical symptoms.  This test should not be used as a "test of cure" because C. difficile DNA  will persist after successful treatment. Repeat testing will not be  permitted.    This test is performed on the BD MAX system using Real-Time PCR and  fluorogenic target-specific hybridization.    C Diff by PCR Result: NotDetec

## 2020-02-12 NOTE — PROGRESS NOTE ADULT - SUBJECTIVE AND OBJECTIVE BOX
Patient is a 54y old  Female who presents with a chief complaint of nausea, vomiting, diarrhea, abdominal pain    Patient reports abd pain has improved   Moved bowel 4 times today    REVIEW OF SYSTEMS: denies fever, chills, SOB, palpitations, chest pain, dizziness    MEDICATIONS  (STANDING):  ciprofloxacin   IVPB 400 milliGRAM(s) IV Intermittent every 12 hours  dextrose 5% + sodium chloride 0.9% with potassium chloride 20 mEq/L 1000 milliLiter(s) (75 mL/Hr) IV Continuous <Continuous>  lactated ringers. 1000 milliLiter(s) (75 mL/Hr) IV Continuous <Continuous>  metroNIDAZOLE  IVPB 500 milliGRAM(s) IV Intermittent every 8 hours  potassium chloride  10 mEq/100 mL IVPB 10 milliEquivalent(s) IV Intermittent once    MEDICATIONS  (PRN):  ketorolac   Injectable 15 milliGRAM(s) IV Push every 6 hours PRN Mild Pain (1 - 3)  ketorolac   Injectable 30 milliGRAM(s) IV Push every 6 hours PRN Moderate Pain (4 - 6)  ondansetron Injectable 4 milliGRAM(s) IV Push every 6 hours PRN Nausea and/or Vomiting      PHYSICAL EXAM:  GENERAL: NAD  NERVOUS SYSTEM:  Alert & Oriented X3, Good concentration; Motor Strength 5/5 B/L upper and lower extremities  CHEST/LUNG: Clear to auscultation bilaterally; No rales, rhonchi, wheezing, or rubs  HEART: Regular rate and rhythm; No murmurs, rubs, or gallops  ABDOMEN: Soft, mildly tender in RLQ, Nondistended; Bowel sounds present  EXTREMITIES:  2+ Peripheral Pulses, No clubbing, cyanosis, or edema  SKIN: No rashes or lesions  LABS:                        11.2   8.97  )-----------( 287      ( 2020 06:22 )             35.5       143  |  113<H>  |  5<L>  ----------------------------<  119<H>  3.5   |  24  |  0.61      Urinalysis Basic - ( 10 Feb 2020 21:50 )    Color: Yellow / Appearance: Clear / S.010 / pH: x  Gluc: x / Ketone: Trace  / Bili: Negative / Urobili: Negative   Blood: x / Protein: 30 mg/dL / Nitrite: Negative   Leuk Esterase: Small / RBC: 0-2 /HPF / WBC 6-10 /HPF   Sq Epi: x / Non Sq Epi: Many /HPF / Bacteria: Moderate /HPF

## 2020-02-12 NOTE — PROGRESS NOTE ADULT - SUBJECTIVE AND OBJECTIVE BOX
HPI:  54 year old Woman with hx of HTN, tachycardia (unknown), breast cancer (in remission), gastritis, anemia, s/p cholecystectomy, presented to from home to the ED with c/o ~2 day hx of fever, abdominal pain and diarrhea.    OVERNIGHT EVENTS:  No new overnight events     REVIEW OF SYSTEMS:    CONSTITUTIONAL: No fever,   EYES: no acute visual disturbances  NECK: No pain or stiffness  RESPIRATORY: No cough; No shortness of breath  CARDIOVASCULAR: No chest pain, no palpitations  GASTROINTESTINAL: + pain, nausea and diarrhea. No vomiting  NEUROLOGICAL: No headache or numbness, no tremors  MUSCULOSKELETAL: No joint pain, no muscle pain  GENITOURINARY: no dysuria, no frequency, no hesitancy  PSYCHIATRY: no depression , no anxiety  ALL OTHER  ROS negative        Vital Signs Last 24 Hrs  T(C): 36.8 (2020 07:35), Max: 36.8 (2020 07:35)  T(F): 98.2 (2020 07:35), Max: 98.2 (2020 07:35)  HR: 60 (2020 07:35) (60 - 74)  BP: 99/65 (2020 07:35) (99/65 - 113/64)  BP(mean): --  RR: 18 (2020 07:35) (18 - 18)  SpO2: 96% (2020 07:35) (96% - 99%)    ________________________________________________  PHYSICAL EXAM:    GENERAL: NAD  HEENT: Normocephalic; conjunctivae and sclerae clear; moist mucous membranes;   NECK : supple, no JVD  CHEST/LUNG: Clear to auscultation bilaterally   HEART: S1 S2  regular  ABDOMEN: Soft, Nontender, Nondistended; Bowel sounds present  EXTREMITIES: no cyanosis; no LE edema; no calf tenderness  SKIN: warm and dry; no rash  NERVOUS SYSTEM:  Alert & Oriented x3; no new deficits    _________________________________________________  CURRENT MEDICATIONS:    MEDICATIONS  (STANDING):  ATENolol  Tablet 25 milliGRAM(s) Oral daily  ciprofloxacin   IVPB 400 milliGRAM(s) IV Intermittent every 12 hours  dextrose 5% + sodium chloride 0.9% with potassium chloride 20 mEq/L 1000 milliLiter(s) (75 mL/Hr) IV Continuous <Continuous>  lactated ringers. 1000 milliLiter(s) (75 mL/Hr) IV Continuous <Continuous>  metroNIDAZOLE  IVPB 500 milliGRAM(s) IV Intermittent every 8 hours  potassium chloride  10 mEq/100 mL IVPB 10 milliEquivalent(s) IV Intermittent once    MEDICATIONS  (PRN):  acetaminophen   Tablet .. 650 milliGRAM(s) Oral every 6 hours PRN Mild Pain (1 - 3)  ketorolac   Injectable 15 milliGRAM(s) IV Push every 6 hours PRN Mild Pain (1 - 3)  ketorolac   Injectable 30 milliGRAM(s) IV Push every 6 hours PRN Moderate Pain (4 - 6)  ondansetron Injectable 4 milliGRAM(s) IV Push every 6 hours PRN Nausea and/or Vomiting      __________________________________________________  LABS:                          11.2   8.97  )-----------( 287      ( 2020 06:22 )             35.5     02-12    144  |  113<H>  |  4<L>  ----------------------------<  91  3.4<L>   |  25  |  0.63    Ca    8.3<L>      2020 06:22  Mg     2.3     12    TPro  6.5  /  Alb  2.5<L>  /  TBili  0.2  /  DBili  x   /  AST  27  /  ALT  31  /  AlkPhos  109  02      Urinalysis Basic - ( 10 Feb 2020 21:50 )    Color: Yellow / Appearance: Clear / S.010 / pH: x  Gluc: x / Ketone: Trace  / Bili: Negative / Urobili: Negative   Blood: x / Protein: 30 mg/dL / Nitrite: Negative   Leuk Esterase: Small / RBC: 0-2 /HPF / WBC 6-10 /HPF   Sq Epi: x / Non Sq Epi: Many /HPF / Bacteria: Moderate /HPF      CAPILLARY BLOOD GLUCOSE          __________________________________________________  RADIOLOGY & ADDITIONAL TESTS:    Imaging Personally Reviewed:  YES    < from: CT Abdomen and Pelvis w/ IV Cont (02.10.20 @ 19:49) >  IMPRESSION:     Pancolitis, most likely on an infectious or inflammatory basis.     < end of copied text >    Consultant(s) Notes Reviewed:   YES     Plan of care was discussed with patient and /or primary care giver; all questions and concerns were addressed and care was aligned with patient's wishes.

## 2020-02-13 LAB
ALBUMIN SERPL ELPH-MCNC: 2.3 G/DL — LOW (ref 3.5–5)
ALP SERPL-CCNC: 91 U/L — SIGNIFICANT CHANGE UP (ref 40–120)
ALT FLD-CCNC: 23 U/L DA — SIGNIFICANT CHANGE UP (ref 10–60)
ANION GAP SERPL CALC-SCNC: 7 MMOL/L — SIGNIFICANT CHANGE UP (ref 5–17)
AST SERPL-CCNC: 18 U/L — SIGNIFICANT CHANGE UP (ref 10–40)
BILIRUB SERPL-MCNC: 0.2 MG/DL — SIGNIFICANT CHANGE UP (ref 0.2–1.2)
BUN SERPL-MCNC: 3 MG/DL — LOW (ref 7–18)
CALCIUM SERPL-MCNC: 7.7 MG/DL — LOW (ref 8.4–10.5)
CHLORIDE SERPL-SCNC: 113 MMOL/L — HIGH (ref 96–108)
CO2 SERPL-SCNC: 24 MMOL/L — SIGNIFICANT CHANGE UP (ref 22–31)
CREAT SERPL-MCNC: 0.54 MG/DL — SIGNIFICANT CHANGE UP (ref 0.5–1.3)
CULTURE RESULTS: SIGNIFICANT CHANGE UP
GLUCOSE BLDC GLUCOMTR-MCNC: 98 MG/DL — SIGNIFICANT CHANGE UP (ref 70–99)
GLUCOSE SERPL-MCNC: 113 MG/DL — HIGH (ref 70–99)
HCT VFR BLD CALC: 32 % — LOW (ref 34.5–45)
HGB BLD-MCNC: 10.2 G/DL — LOW (ref 11.5–15.5)
MCHC RBC-ENTMCNC: 29.2 PG — SIGNIFICANT CHANGE UP (ref 27–34)
MCHC RBC-ENTMCNC: 31.9 GM/DL — LOW (ref 32–36)
MCV RBC AUTO: 91.7 FL — SIGNIFICANT CHANGE UP (ref 80–100)
NRBC # BLD: 0 /100 WBCS — SIGNIFICANT CHANGE UP (ref 0–0)
PLATELET # BLD AUTO: 269 K/UL — SIGNIFICANT CHANGE UP (ref 150–400)
POTASSIUM SERPL-MCNC: 3.6 MMOL/L — SIGNIFICANT CHANGE UP (ref 3.5–5.3)
POTASSIUM SERPL-SCNC: 3.6 MMOL/L — SIGNIFICANT CHANGE UP (ref 3.5–5.3)
PROT SERPL-MCNC: 5.8 G/DL — LOW (ref 6–8.3)
RBC # BLD: 3.49 M/UL — LOW (ref 3.8–5.2)
RBC # FLD: 14.4 % — SIGNIFICANT CHANGE UP (ref 10.3–14.5)
SODIUM SERPL-SCNC: 144 MMOL/L — SIGNIFICANT CHANGE UP (ref 135–145)
SPECIMEN SOURCE: SIGNIFICANT CHANGE UP
WBC # BLD: 8.29 K/UL — SIGNIFICANT CHANGE UP (ref 3.8–10.5)
WBC # FLD AUTO: 8.29 K/UL — SIGNIFICANT CHANGE UP (ref 3.8–10.5)

## 2020-02-13 PROCEDURE — 99233 SBSQ HOSP IP/OBS HIGH 50: CPT | Mod: GC

## 2020-02-13 PROCEDURE — 99232 SBSQ HOSP IP/OBS MODERATE 35: CPT

## 2020-02-13 RX ORDER — CHOLECALCIFEROL (VITAMIN D3) 125 MCG
1000 CAPSULE ORAL DAILY
Refills: 0 | Status: DISCONTINUED | OUTPATIENT
Start: 2020-02-13 | End: 2020-02-14

## 2020-02-13 RX ORDER — ACETAMINOPHEN 500 MG
650 TABLET ORAL EVERY 6 HOURS
Refills: 0 | Status: DISCONTINUED | OUTPATIENT
Start: 2020-02-13 | End: 2020-02-14

## 2020-02-13 RX ADMIN — Medication 200 MILLIGRAM(S): at 18:22

## 2020-02-13 RX ADMIN — Medication 200 MILLIGRAM(S): at 11:11

## 2020-02-13 RX ADMIN — Medication 650 MILLIGRAM(S): at 16:30

## 2020-02-13 RX ADMIN — Medication 650 MILLIGRAM(S): at 23:45

## 2020-02-13 RX ADMIN — DEXTROSE MONOHYDRATE, SODIUM CHLORIDE, AND POTASSIUM CHLORIDE 75 MILLILITER(S): 50; .745; 4.5 INJECTION, SOLUTION INTRAVENOUS at 13:13

## 2020-02-13 RX ADMIN — Medication 650 MILLIGRAM(S): at 15:23

## 2020-02-13 RX ADMIN — Medication 100 MILLIGRAM(S): at 15:22

## 2020-02-13 RX ADMIN — Medication 100 MILLIGRAM(S): at 06:21

## 2020-02-13 RX ADMIN — Medication 100 MILLIGRAM(S): at 21:54

## 2020-02-13 RX ADMIN — Medication 200 MILLIGRAM(S): at 06:21

## 2020-02-13 RX ADMIN — ONDANSETRON 4 MILLIGRAM(S): 8 TABLET, FILM COATED ORAL at 08:03

## 2020-02-13 NOTE — CONSULT NOTE ADULT - SUBJECTIVE AND OBJECTIVE BOX
Patient is a 54y old  Female who presents with a chief complaint of nausea, vomiting, diarrhea, abdominal pain (13 Feb 2020 09:36)    HPI: 54y Female  presents with a chief complaint of         . The patient has a significant past medical history of           .     REVIEW OF SYSTEMS  Constitutional:   No fever, no fatigue, no pallor, no night sweats, no weight loss.  HEENT:   No eye pain, no vision changes, no icterus, no mouth ulcers.  Respiratory:   No shortness of breath, no cough, no respiratory distress.   Cardiovascular:   No chest pain, no palpitations.   Gastrointestinal: No abdominal pain, no nausea, no vomiting , no diahrrea, no constipation, no hematochezia,no melena.  Skin:   No rashes, no jaundice, no eczema.   Musculoskeletal:   No joint pain, no swelling, no myalgia.   Neurologic:   No headache, no seizure, no weakness.   Genitourinary:   No dysuria, no decreased urine output.  Psychiatric:  No depression, no anxiety,   Endocrine:   No thyroid disease, no diabetes.  Heme/Lymphatic:   No anemia, no blood transfusions, no lymph node enlargement, no bleeding, no bruising.  ___________________________________________________________________________________________  Allergies    honey (Short breath)  No Known Drug Allergies    Intolerances      MEDICATIONS  (STANDING):  cholecalciferol 1000 Unit(s) Oral daily  ciprofloxacin   IVPB 400 milliGRAM(s) IV Intermittent every 12 hours  dextrose 5% + sodium chloride 0.9% with potassium chloride 20 mEq/L 1000 milliLiter(s) (75 mL/Hr) IV Continuous <Continuous>  lactated ringers. 1000 milliLiter(s) (75 mL/Hr) IV Continuous <Continuous>  metroNIDAZOLE  IVPB 500 milliGRAM(s) IV Intermittent every 8 hours    MEDICATIONS  (PRN):  guaiFENesin   Syrup  (Sugar-Free) 200 milliGRAM(s) Oral every 6 hours PRN Cough  ondansetron Injectable 4 milliGRAM(s) IV Push every 6 hours PRN Nausea and/or Vomiting      PAST MEDICAL & SURGICAL HISTORY:  Gastritis  Breast cancer  Obesity  Rheumatoid arthritis  Vitamin D deficiency  Anemia: iron-deficiency  Asthma  History of cholecystectomy  [place] teeth extracted  S/P bilateral breast biopsy  History of tonsillectomy    FAMILY HISTORY:  FH: breast cancer  Family history of MI (myocardial infarction)  Family history of hypertension  Family history of diabetes mellitus    Social History: No hsitory of : Tobacco use, IVDA, EToH  ______________________________________________________________________________________    PHYSICAL EXAM    Daily     Daily   BMI: 38.7 (02-10 @ 17:28)  Change in Weight:  Vital Signs Last 24 Hrs  T(C): 36.6 (13 Feb 2020 07:38), Max: 37.7 (13 Feb 2020 00:02)  T(F): 97.8 (13 Feb 2020 07:38), Max: 99.8 (13 Feb 2020 00:02)  HR: 70 (13 Feb 2020 07:38) (66 - 90)  BP: 130/67 (13 Feb 2020 07:38) (107/64 - 130/75)  BP(mean): --  RR: 17 (13 Feb 2020 07:38) (16 - 17)  SpO2: 99% (13 Feb 2020 00:02) (98% - 99%)    General:  Well developed, well nourished, alert and active, no pallor, NAD.  HEENT:    Normal appearance of conjunctiva, ears, nose, lips, oropharynx, and oral mucosa, anicteric.  Neck:  No masses, no asymmetry.  Lymph Nodes:  No lymphadenopathy.   Cardiovascular:  RRR normal S1/S2, no murmur.  Respiratory:  CTA B/L, normal respiratory effort.   Abdominal:   soft, no masses or tenderness, normoactive BS, NT/ND, no HSM.  Extremities:   No clubbing or cyanosis, normal capillary refill, no edema.   Skin:   No rash, jaundice, lesions, eczema.   Musculoskeletal:  No joint swelling, erythema or tenderness.   Neuro: No focal deficits.   Other:   _______________________________________________________________________________________________  Lab Results:                          10.2   8.29  )-----------( 269      ( 13 Feb 2020 06:28 )             32.0     02-13    144  |  113<H>  |  3<L>  ----------------------------<  113<H>  3.6   |  24  |  0.54    Ca    7.7<L>      13 Feb 2020 06:28  Mg     2.3     02-12    TPro  5.8<L>  /  Alb  2.3<L>  /  TBili  0.2  /  DBili  x   /  AST  18  /  ALT  23  /  AlkPhos  91  02-13    LIVER FUNCTIONS - ( 13 Feb 2020 06:28 )  Alb: 2.3 g/dL / Pro: 5.8 g/dL / ALK PHOS: 91 U/L / ALT: 23 U/L DA / AST: 18 U/L / GGT: x                   Stool Results:  Culture Results:   Testing in progress (02-12 @ 17:37)  Culture Results:   Campylobacter species  DETECTED by PCR  *******Please Note:*******  GI panel PCR evaluates for:  Campylobacter, Plesiomonas shigelloides, Salmonella,  Vibrio, Yersinia enterocolitica, Enteroaggregative  Escherichia coli (EAEC), Enteropathogenic E.coli (EPEC),  Enterotoxigenic E. coli (ETEC) lt/st, Shiga-like  toxin-producing E. coli (STEC) stx1/stx2,  Shigella/ Enteroinvasive E. coli (EIEC), Cryptosporidium,  Cyclospora cayetanensis, Entamoeba histolytica,  Giardia lamblia, Adenovirus F 40/41, Astrovirus,  Norovirus GI/GII, Rotavirus A, Sapovirus (02-12 @ 17:33)    02-12 @ 17:37  Stool Culture --  Results   Testing in progress  Organism -- --    O&P  --  02-12 @ 17:33  Stool Culture --  Results   Campylobacter species  DETECTED by PCR  *******Please Note:*******  GI panel PCR evaluates for:  Campylobacter, Plesiomonas shigelloides, Salmonella,  Vibrio, Yersinia enterocolitica, Enteroaggregative  Escherichia coli (EAEC), Enteropathogenic E.coli (EPEC),  Enterotoxigenic E. coli (ETEC) lt/st, Shiga-like  toxin-producing E. coli (STEC) stx1/stx2,  Shigella/ Enteroinvasive E. coli (EIEC), Cryptosporidium,  Cyclospora cayetanensis, Entamoeba histolytica,  Giardia lamblia, Adenovirus F 40/41, Astrovirus,  Norovirus GI/GII, Rotavirus A, Sapovirus  Organism -- --    O&P  --        RADIOLOGY RESULTS:  < from: CT Abdomen and Pelvis w/ IV Cont (02.10.20 @ 19:49) >    EXAM:  CT ABDOMEN AND PELVIS IC                            PROCEDURE DATE:  02/10/2020          INTERPRETATION:  CLINICAL INFORMATION: Lower abdominal pain. Rule out colitis versus diverticulitis.    COMPARISON: August 15, 2015    PROCEDURE:   CT of the Abdomen and Pelvis was performed with intravenous contrast.   Intravenous contrast: 90 ml Omnipaque 350. 10 ml discarded.  Oral contrast: None.  Sagittal and coronal reformats were performed.    FINDINGS:    LOWER CHEST: Cardiomegaly. Status post bilateral mastectomy and breast reconstruction. Thin-walled cyst at the left lung base.    LIVER: Within normal limits.  BILE DUCTS: Normal caliber.  GALLBLADDER: Cholecystectomy.  SPLEEN: Within normal limits.  PANCREAS: Within normal limits.  ADRENALS: Within normal limits.  KIDNEYS/URETERS: Within normal limits.    BLADDER: Within normal limits.  REPRODUCTIVE ORGANS: Uterus and adnexa within normal limits.    BOWEL: Extensive colonic wall thickening, most severe in the right colon, however involving the entire colon and rectum. Appendix is also thickened which could be due to reactive inflammation versus direct infectious/inflammatory involvement.  PERITONEUM: No ascites.  VESSELS: Within normal limits.  RETROPERITONEUM/LYMPH NODES: No lymphadenopathy.    ABDOMINAL WALL: Postsurgical changes in the ventral abdominal wall.  BONES: Within normal limits.    IMPRESSION:     Pancolitis, most likely on an infectious or inflammatory basis.                         HARINI TORREZ M.D., ATTENDING RADIOLOGIST  This document has been electronically signed. Feb 10 2020  7:58PM                < end of copied text >    SURGICAL PATHOLOGY: Patient is a 54y old  Female who presents with a chief complaint of nausea, vomiting, diarrhea, abdominal pain (13 Feb 2020 09:36)       Patient is a 55yo female with PMH of HTN, tachycardia (unknown), breast cancer (in remission), gastritis, anemia, s/p cholecystectomy, presented to ER for fever, abdominal pain and diarrhea. Patient reports her symptoms started 1 week ago, initially had abdominal discomfort associated with  loose bowel movements, about 2/day which increased in frequency to 6-7/day, associated with mucus but no blood. She describes her abdominal pain as severe, episodic pain, ranging from 0-8/10 in severity, mostly in the lower quadrants, no precipitating factors, relieved by advil. She reports nausea since 3 days, associated with multiple episodes of NBNB vomiting, 5 since this AM. Patient endorses fever of 101 2 days PTA associated with chills, weakness and headache. Denies recent travel, sick contacts or eating food from outside.  		    REVIEW OF SYSTEMS  Constitutional:   No fever, no fatigue, no pallor, no night sweats, no weight loss.  HEENT:   No eye pain, no vision changes, no icterus, no mouth ulcers.  Respiratory:   No shortness of breath, no cough, no respiratory distress.   Cardiovascular:   No chest pain, no palpitations.   Gastrointestinal: No abdominal pain, no nausea, no vomiting , no diahrrea, no constipation, no hematochezia,no melena.  Skin:   No rashes, no jaundice, no eczema.   Musculoskeletal:   No joint pain, no swelling, no myalgia.   Neurologic:   No headache, no seizure, no weakness.   Genitourinary:   No dysuria, no decreased urine output.  Psychiatric:  No depression, no anxiety,   Endocrine:   No thyroid disease, no diabetes.  Heme/Lymphatic:   No anemia, no blood transfusions, no lymph node enlargement, no bleeding, no bruising.  ___________________________________________________________________________________________  Allergies    honey (Short breath)  No Known Drug Allergies    Intolerances      MEDICATIONS  (STANDING):  cholecalciferol 1000 Unit(s) Oral daily  ciprofloxacin   IVPB 400 milliGRAM(s) IV Intermittent every 12 hours  dextrose 5% + sodium chloride 0.9% with potassium chloride 20 mEq/L 1000 milliLiter(s) (75 mL/Hr) IV Continuous <Continuous>  lactated ringers. 1000 milliLiter(s) (75 mL/Hr) IV Continuous <Continuous>  metroNIDAZOLE  IVPB 500 milliGRAM(s) IV Intermittent every 8 hours    MEDICATIONS  (PRN):  guaiFENesin   Syrup  (Sugar-Free) 200 milliGRAM(s) Oral every 6 hours PRN Cough  ondansetron Injectable 4 milliGRAM(s) IV Push every 6 hours PRN Nausea and/or Vomiting      PAST MEDICAL & SURGICAL HISTORY:  Gastritis  Breast cancer  Obesity  Rheumatoid arthritis  Vitamin D deficiency  Anemia: iron-deficiency  Asthma  History of cholecystectomy  [place] teeth extracted  S/P bilateral breast biopsy  History of tonsillectomy    FAMILY HISTORY:  FH: breast cancer  Family history of MI (myocardial infarction)  Family history of hypertension  Family history of diabetes mellitus    Social History: No hsitory of : Tobacco use, IVDA, EToH  ______________________________________________________________________________________    PHYSICAL EXAM    Daily     Daily   BMI: 38.7 (02-10 @ 17:28)  Change in Weight:  Vital Signs Last 24 Hrs  T(C): 36.6 (13 Feb 2020 07:38), Max: 37.7 (13 Feb 2020 00:02)  T(F): 97.8 (13 Feb 2020 07:38), Max: 99.8 (13 Feb 2020 00:02)  HR: 70 (13 Feb 2020 07:38) (66 - 90)  BP: 130/67 (13 Feb 2020 07:38) (107/64 - 130/75)  BP(mean): --  RR: 17 (13 Feb 2020 07:38) (16 - 17)  SpO2: 99% (13 Feb 2020 00:02) (98% - 99%)    General:  Well developed, well nourished, alert and active, no pallor, NAD.  HEENT:    Normal appearance of conjunctiva, ears, nose, lips, oropharynx, and oral mucosa, anicteric.  Neck:  No masses, no asymmetry.  Lymph Nodes:  No lymphadenopathy.   Cardiovascular:  RRR normal S1/S2, no murmur.  Respiratory:  CTA B/L, normal respiratory effort.   Abdominal:   soft, no masses or tenderness, normoactive BS, NT/ND, no HSM.  Extremities:   No clubbing or cyanosis, normal capillary refill, no edema.   Skin:   No rash, jaundice, lesions, eczema.   Musculoskeletal:  No joint swelling, erythema or tenderness.   Neuro: No focal deficits.   Other:   _______________________________________________________________________________________________  Lab Results:                          10.2   8.29  )-----------( 269      ( 13 Feb 2020 06:28 )             32.0     02-13    144  |  113<H>  |  3<L>  ----------------------------<  113<H>  3.6   |  24  |  0.54    Ca    7.7<L>      13 Feb 2020 06:28  Mg     2.3     02-12    TPro  5.8<L>  /  Alb  2.3<L>  /  TBili  0.2  /  DBili  x   /  AST  18  /  ALT  23  /  AlkPhos  91  02-13    LIVER FUNCTIONS - ( 13 Feb 2020 06:28 )  Alb: 2.3 g/dL / Pro: 5.8 g/dL / ALK PHOS: 91 U/L / ALT: 23 U/L DA / AST: 18 U/L / GGT: x                   Stool Results:  Culture Results:   Testing in progress (02-12 @ 17:37)  Culture Results:   Campylobacter species  DETECTED by PCR  *******Please Note:*******  GI panel PCR evaluates for:  Campylobacter, Plesiomonas shigelloides, Salmonella,  Vibrio, Yersinia enterocolitica, Enteroaggregative  Escherichia coli (EAEC), Enteropathogenic E.coli (EPEC),  Enterotoxigenic E. coli (ETEC) lt/st, Shiga-like  toxin-producing E. coli (STEC) stx1/stx2,  Shigella/ Enteroinvasive E. coli (EIEC), Cryptosporidium,  Cyclospora cayetanensis, Entamoeba histolytica,  Giardia lamblia, Adenovirus F 40/41, Astrovirus,  Norovirus GI/GII, Rotavirus A, Sapovirus (02-12 @ 17:33)    02-12 @ 17:37  Stool Culture --  Results   Testing in progress  Organism -- --    O&P  --  02-12 @ 17:33  Stool Culture --  Results   Campylobacter species  DETECTED by PCR  *******Please Note:*******  GI panel PCR evaluates for:  Campylobacter, Plesiomonas shigelloides, Salmonella,  Vibrio, Yersinia enterocolitica, Enteroaggregative  Escherichia coli (EAEC), Enteropathogenic E.coli (EPEC),  Enterotoxigenic E. coli (ETEC) lt/st, Shiga-like  toxin-producing E. coli (STEC) stx1/stx2,  Shigella/ Enteroinvasive E. coli (EIEC), Cryptosporidium,  Cyclospora cayetanensis, Entamoeba histolytica,  Giardia lamblia, Adenovirus F 40/41, Astrovirus,  Norovirus GI/GII, Rotavirus A, Sapovirus  Organism -- --    O&P  --        RADIOLOGY RESULTS:  < from: CT Abdomen and Pelvis w/ IV Cont (02.10.20 @ 19:49) >    EXAM:  CT ABDOMEN AND PELVIS IC                            PROCEDURE DATE:  02/10/2020          INTERPRETATION:  CLINICAL INFORMATION: Lower abdominal pain. Rule out colitis versus diverticulitis.    COMPARISON: August 15, 2015    PROCEDURE:   CT of the Abdomen and Pelvis was performed with intravenous contrast.   Intravenous contrast: 90 ml Omnipaque 350. 10 ml discarded.  Oral contrast: None.  Sagittal and coronal reformats were performed.    FINDINGS:    LOWER CHEST: Cardiomegaly. Status post bilateral mastectomy and breast reconstruction. Thin-walled cyst at the left lung base.    LIVER: Within normal limits.  BILE DUCTS: Normal caliber.  GALLBLADDER: Cholecystectomy.  SPLEEN: Within normal limits.  PANCREAS: Within normal limits.  ADRENALS: Within normal limits.  KIDNEYS/URETERS: Within normal limits.    BLADDER: Within normal limits.  REPRODUCTIVE ORGANS: Uterus and adnexa within normal limits.    BOWEL: Extensive colonic wall thickening, most severe in the right colon, however involving the entire colon and rectum. Appendix is also thickened which could be due to reactive inflammation versus direct infectious/inflammatory involvement.  PERITONEUM: No ascites.  VESSELS: Within normal limits.  RETROPERITONEUM/LYMPH NODES: No lymphadenopathy.    ABDOMINAL WALL: Postsurgical changes in the ventral abdominal wall.  BONES: Within normal limits.    IMPRESSION:     Pancolitis, most likely on an infectious or inflammatory basis.                         HARINI TORREZ M.D., ATTENDING RADIOLOGIST  This document has been electronically signed. Feb 10 2020  7:58PM                < end of copied text >    SURGICAL PATHOLOGY:   < from: CT Abdomen and Pelvis w/ IV Cont (02.10.20 @ 19:49) >      < end of copied text >

## 2020-02-13 NOTE — CONSULT NOTE ADULT - ASSESSMENT
54 year old female presents with pancolitis       Colitis       Advanced care planning was discussed with patient and family.  Advanced care planning forms were reviewed and discussed.  Risks, benefits and alternatives of gastroenterologic procedures were discussed in detail and all questions were answered. 54 year old female presents with pancolitis       Colitis   Clinically Improving   Advance diet today   Stool cultures follow up   Complete anbx a total of 14 days (given pancolitis)  Needs a repeat interval colonoscopy as an outpatient (last colonoscopy was five years ago)    Advanced care planning was discussed with patient and family.  Advanced care planning forms were reviewed and discussed.  Risks, benefits and alternatives of gastroenterologic procedures were discussed in detail and all questions were answered.

## 2020-02-13 NOTE — PROGRESS NOTE ADULT - ATTENDING COMMENTS
Patient is a 54y old  Female who presents with a chief complaint of nausea, vomiting, diarrhea, abdominal pain     Today  Patient denies any abd pain, 3 bowel movements from this morning  Complains of nausea     REVIEW OF SYSTEMS: denies fever, chills, SOB, palpitations, chest pain, constipation, dizziness    PHYSICAL EXAM:  GENERAL: NAD, well-groomed, well-developed  NERVOUS SYSTEM:  Alert & Oriented X3, Good concentration; Motor Strength 5/5 B/L upper and lower extremities  CHEST/LUNG: Clear to auscultation bilaterally; No rales, rhonchi, wheezing, or rubs  HEART: Regular rate and rhythm; No murmurs, rubs, or gallops  ABDOMEN: Soft, mild tenderness in RLQ, Nondistended; Bowel sounds present  EXTREMITIES:  2+ Peripheral Pulses, No clubbing, cyanosis, or edema  SKIN: No rashes or lesions  LABS:                        10.2   8.29  )-----------( 269      ( 13 Feb 2020 06:28 )             32.0       144  |  113<H>  |  3<L>  ----------------------------<  113<H>  3.6   |  24  |  0.54    Assessment and plan:  1. Pancolitis  Will advance diet to soft  IVF  Cont Cipro and Flagyl   Surgery consult appreciated   Stool + for Campylobacter     2. HTN  Cont Atenolol     3. H/o breast cancer s/p surgery   Stable     4. Vit D deficiency   Cont 1000U daily Patient is a 54y old  Female who presents with a chief complaint of nausea, vomiting, diarrhea, abdominal pain     Today  Patient denies any abd pain, 3 bowel movements from this morning  Complains of nausea     REVIEW OF SYSTEMS: denies fever, chills, SOB, palpitations, chest pain, constipation, dizziness    PHYSICAL EXAM:  GENERAL: NAD, well-groomed, well-developed  NERVOUS SYSTEM:  Alert & Oriented X3, Good concentration; Motor Strength 5/5 B/L upper and lower extremities  CHEST/LUNG: Clear to auscultation bilaterally; No rales, rhonchi, wheezing, or rubs  HEART: Regular rate and rhythm; No murmurs, rubs, or gallops  ABDOMEN: Soft, mild tenderness in RLQ, Nondistended; Bowel sounds present  EXTREMITIES:  2+ Peripheral Pulses, No clubbing, cyanosis, or edema  SKIN: No rashes or lesions  LABS:                        10.2   8.29  )-----------( 269      ( 13 Feb 2020 06:28 )             32.0       144  |  113<H>  |  3<L>  ----------------------------<  113<H>  3.6   |  24  |  0.54    Assessment and plan:  1. Pancolitis  Will advance diet to soft  IVF  Cont Cipro and Flagyl   Surgery consult appreciated   Stool + for Campylobacter   Counselled about low fibre diet after discharge and follow up with Gastroenterology for Colonoscopy     2. HTN  Cont Atenolol     3. H/o breast cancer s/p surgery   Stable     4. Vit D deficiency   Cont 1000U daily

## 2020-02-13 NOTE — PROGRESS NOTE ADULT - ASSESSMENT
54y old Female with pancolitis    - clear liquid diet  - continue antibiotics  - f/u labs  - DVT prophylaxis, Incentive Spirometer, OOB, Ambulating, pain control  - no surgical intervention at this time

## 2020-02-13 NOTE — PROGRESS NOTE ADULT - SUBJECTIVE AND OBJECTIVE BOX
Patient seen and examined at bedside with no complaints.   Admits to diarrhea and nausea.  Denies pain and vomiting.     Vital Signs Last 24 Hrs  T(F): 97.8 (02-13-20 @ 07:38), Max: 99.8 (02-13-20 @ 00:02)  HR: 70 (02-13-20 @ 07:38)  BP: 130/67 (02-13-20 @ 07:38)  RR: 17 (02-13-20 @ 07:38)  SpO2: 99% (02-13-20 @ 00:02)  POCT Blood Glucose.: 98 mg/dL (13 Feb 2020 00:09)    GENERAL: Alert, NAD  CHEST/LUNG: respirations nonlabored  ABDOMEN: soft, RLQ tenderness to deep palpation, Nondistended  EXTREMITIES:  no calf tenderness, No edema    I&O's Detail    LABS:                        10.2   8.29  )-----------( 269      ( 13 Feb 2020 06:28 )             32.0     02-13    144  |  113<H>  |  3<L>  ----------------------------<  113<H>  3.6   |  24  |  0.54    Ca    7.7<L>      13 Feb 2020 06:28  Mg     2.3     02-12    TPro  5.8<L>  /  Alb  2.3<L>  /  TBili  0.2  /  DBili  x   /  AST  18  /  ALT  23  /  AlkPhos  91  02-13

## 2020-02-13 NOTE — PROGRESS NOTE ADULT - PROBLEM SELECTOR PLAN 3
Vit D 22.9  Start supplement 2000 units QD when pt able to tolerate PO Vit D 22.9  cholecalciferol 1000 units daily

## 2020-02-13 NOTE — PROGRESS NOTE ADULT - PROBLEM SELECTOR PLAN 2
resolved  continue D5NS with 20 K  monitor BMP resolved  will continue D5NS with 20 K for another day until patient starts to eat and drink more  monitor BMP

## 2020-02-13 NOTE — PROGRESS NOTE ADULT - PROBLEM SELECTOR PLAN 1
CT abdomen showing pancolitis   afebrile and no leukocytosis  Continue with Cipro & Flagyl (Day 3)  pain management with toradol PRN  No surgical interventions at this time  patient tolerating clear liquid diet; may advance diet   Continue with PRN Zofran  GI PCR showing campylobacter  blood cx negative; c diff negative CT abdomen showing pancolitis   afebrile and no leukocytosis  GI PCR showing campylobacter  Continue with Cipro & Flagyl (Day 3)  No surgical interventions at this time  patient tolerating clear liquid diet; will advance diet to soft  Continue with PRN Zofran  blood cx negative; c diff negative CT abdomen showing pancolitis   afebrile and no leukocytosis  GI PCR showing campylobacter  Continue with Cipro & Flagyl (Day 3)  No surgical interventions at this time  patient tolerating clear liquid diet; will advance diet to soft  Continue with PRN Zofran  blood cx negative; c diff negative  GI Dr. Mtz consulted

## 2020-02-13 NOTE — PROGRESS NOTE ADULT - PROBLEM SELECTOR PLAN 4
Controlled  Pt not on any maintenance medications at home  Consider IV Hydralazine, while pt is NPO, if medically needed  Continue to monitor BP
borderline high alk phos likely due to colitis  monitor LFTs
BP controlled  Pt not on any maintenance medications at home  Continue to monitor BP and add meds if needed

## 2020-02-13 NOTE — PROGRESS NOTE ADULT - PROBLEM SELECTOR PROBLEM 3
Hypokalemia due to excessive gastrointestinal loss of potassium
Vitamin D deficiency
Vitamin D deficiency

## 2020-02-13 NOTE — PROGRESS NOTE ADULT - PROBLEM SELECTOR PROBLEM 2
Hypertension
Hypokalemia due to excessive gastrointestinal loss of potassium
Hypokalemia due to excessive gastrointestinal loss of potassium

## 2020-02-13 NOTE — PROGRESS NOTE ADULT - SUBJECTIVE AND OBJECTIVE BOX
PGY 1 Note discussed with supervising resident and primary attending    Patient is a 54y old  Female who presents with a chief complaint of nausea, vomiting, diarrhea, abdominal pain (13 Feb 2020 08:33)    INTERVAL HPI/OVERNIGHT EVENTS: Patient seen and examined at the bedside. Patient states she has some nausea which has been present for a few days now but denies any vomiting. Denies any abdominal pain at this time. Patient tolerating clear liquid diet. Had 2 episodes of diarrhea this morning. Complains of cough but denies any chest pain or shortness of breath. Denies any other complaints or concerns at this time.     MEDICATIONS  (STANDING):  ciprofloxacin   IVPB 400 milliGRAM(s) IV Intermittent every 12 hours  dextrose 5% + sodium chloride 0.9% with potassium chloride 20 mEq/L 1000 milliLiter(s) (75 mL/Hr) IV Continuous <Continuous>  lactated ringers. 1000 milliLiter(s) (75 mL/Hr) IV Continuous <Continuous>  metroNIDAZOLE  IVPB 500 milliGRAM(s) IV Intermittent every 8 hours    MEDICATIONS  (PRN):  guaiFENesin   Syrup  (Sugar-Free) 200 milliGRAM(s) Oral every 6 hours PRN Cough  ketorolac   Injectable 15 milliGRAM(s) IV Push every 6 hours PRN Mild Pain (1 - 3)  ketorolac   Injectable 30 milliGRAM(s) IV Push every 6 hours PRN Moderate Pain (4 - 6)  ondansetron Injectable 4 milliGRAM(s) IV Push every 6 hours PRN Nausea and/or Vomiting      __________________________________________________  REVIEW OF SYSTEMS:  CONSTITUTIONAL: No fever   EYES: no visual disturbances  NECK: No pain   RESPIRATORY: Cough; No shortness of breath  CARDIOVASCULAR: No chest pain  GASTROINTESTINAL: No pain. Nausea but no vomiting; Diarrhea   NEUROLOGICAL: No headache   MUSCULOSKELETAL: No joint pain, no muscle pain  GENITOURINARY: no dysuria  PSYCHIATRY: no anxiety  ALL OTHER  ROS negative        Vital Signs Last 24 Hrs  T(C): 36.6 (13 Feb 2020 07:38), Max: 37.7 (13 Feb 2020 00:02)  T(F): 97.8 (13 Feb 2020 07:38), Max: 99.8 (13 Feb 2020 00:02)  HR: 70 (13 Feb 2020 07:38) (66 - 90)  BP: 130/67 (13 Feb 2020 07:38) (107/64 - 130/75)  RR: 17 (13 Feb 2020 07:38) (16 - 18)  SpO2: 99% (13 Feb 2020 00:02) (98% - 99%)    ________________________________________________  PHYSICAL EXAM:  GENERAL: Patient seen resting in bed and in no acute distress  HEENT: Normocephalic; conjunctivae and sclerae clear   NECK: supple  CHEST/LUNG: Clear to auscultation bilaterally    HEART: S1 S2, regular; no murmurs  ABDOMEN: Soft, Nontender, Nondistended; Bowel sounds present  EXTREMITIES: no edema; no calf tenderness  SKIN: warm and dry  NERVOUS SYSTEM: Awake and alert; Oriented to place, person and time     _________________________________________________  LABS:                        10.2   8.29  )-----------( 269      ( 13 Feb 2020 06:28 )             32.0     02-13    144  |  113<H>  |  3<L>  ----------------------------<  113<H>  3.6   |  24  |  0.54    Ca    7.7<L>      13 Feb 2020 06:28  Mg     2.3     02-12    TPro  5.8<L>  /  Alb  2.3<L>  /  TBili  0.2  /  DBili  x   /  AST  18  /  ALT  23  /  AlkPhos  91  02-13      CAPILLARY BLOOD GLUCOSE    POCT Blood Glucose.: 98 mg/dL (13 Feb 2020 00:09)      RADIOLOGY & ADDITIONAL TESTS:    Imaging Personally Reviewed:  YES     No new imaging     Consultant(s) Notes Reviewed:   YES     Care Discussed with Consultants :     Plan of care was discussed with patient and /or primary care giver; all questions and concerns were addressed and care was aligned with patient's wishes.

## 2020-02-13 NOTE — PROGRESS NOTE ADULT - PROBLEM SELECTOR PLAN 5
IMPROVE VTE Individual Risk Assessment  RISK                                                                Points  [  ] Previous VTE                                                  3  [  ] Thrombophilia                                               2  [  ] Lower limb paralysis                                      2       (unable to hold up >15 seconds)    [  ] Current Cancer                                              2        (within 6 months)  [  ] Immobilization > 24 hrs                                1  [  ] ICU/CCU stay > 24 hours                              1  [  ] Age > 60                                                      1  IMPROVE VTE Score 0, no indication for chemoppx
Resolved  Alk Phos 109  LFT WNL
UA +  Pt asymptomatic  already on cipro and flagyl for colitis

## 2020-02-13 NOTE — PROGRESS NOTE ADULT - ATTENDING COMMENTS
on clear liquids                        10.2   8.29  )-----------( 269      ( 13 Feb 2020 06:28 )             32.0

## 2020-02-13 NOTE — PROGRESS NOTE ADULT - ASSESSMENT
Patient is a 53yo female with PMH of HTN, tachycardia (unknown), breast cancer (in remission), gastritis, anemia, s/p cholecystectomy, presented to ER for fever, abdominal pain and diarrhea.    Admitted for pancolitis.

## 2020-02-14 ENCOUNTER — TRANSCRIPTION ENCOUNTER (OUTPATIENT)
Age: 55
End: 2020-02-14

## 2020-02-14 VITALS
TEMPERATURE: 98 F | OXYGEN SATURATION: 98 % | DIASTOLIC BLOOD PRESSURE: 69 MMHG | RESPIRATION RATE: 18 BRPM | HEART RATE: 69 BPM | SYSTOLIC BLOOD PRESSURE: 132 MMHG

## 2020-02-14 LAB
ANION GAP SERPL CALC-SCNC: 7 MMOL/L — SIGNIFICANT CHANGE UP (ref 5–17)
BASOPHILS # BLD AUTO: 0.07 K/UL — SIGNIFICANT CHANGE UP (ref 0–0.2)
BASOPHILS NFR BLD AUTO: 0.8 % — SIGNIFICANT CHANGE UP (ref 0–2)
BUN SERPL-MCNC: 2 MG/DL — LOW (ref 7–18)
CALCIUM SERPL-MCNC: 8.2 MG/DL — LOW (ref 8.4–10.5)
CALPROTECTIN STL-MCNT: 848 UG/G — HIGH (ref 0–120)
CHLORIDE SERPL-SCNC: 111 MMOL/L — HIGH (ref 96–108)
CO2 SERPL-SCNC: 27 MMOL/L — SIGNIFICANT CHANGE UP (ref 22–31)
CREAT SERPL-MCNC: 0.69 MG/DL — SIGNIFICANT CHANGE UP (ref 0.5–1.3)
CULTURE RESULTS: SIGNIFICANT CHANGE UP
EOSINOPHIL # BLD AUTO: 0.29 K/UL — SIGNIFICANT CHANGE UP (ref 0–0.5)
EOSINOPHIL NFR BLD AUTO: 3.3 % — SIGNIFICANT CHANGE UP (ref 0–6)
GLUCOSE SERPL-MCNC: 151 MG/DL — HIGH (ref 70–99)
HCT VFR BLD CALC: 33.6 % — LOW (ref 34.5–45)
HGB BLD-MCNC: 10.7 G/DL — LOW (ref 11.5–15.5)
IMM GRANULOCYTES NFR BLD AUTO: 6.6 % — HIGH (ref 0–1.5)
LYMPHOCYTES # BLD AUTO: 3 K/UL — SIGNIFICANT CHANGE UP (ref 1–3.3)
LYMPHOCYTES # BLD AUTO: 34 % — SIGNIFICANT CHANGE UP (ref 13–44)
MAGNESIUM SERPL-MCNC: 1.7 MG/DL — SIGNIFICANT CHANGE UP (ref 1.6–2.6)
MCHC RBC-ENTMCNC: 29.5 PG — SIGNIFICANT CHANGE UP (ref 27–34)
MCHC RBC-ENTMCNC: 31.8 GM/DL — LOW (ref 32–36)
MCV RBC AUTO: 92.6 FL — SIGNIFICANT CHANGE UP (ref 80–100)
MONOCYTES # BLD AUTO: 0.94 K/UL — HIGH (ref 0–0.9)
MONOCYTES NFR BLD AUTO: 10.6 % — SIGNIFICANT CHANGE UP (ref 2–14)
NEUTROPHILS # BLD AUTO: 3.95 K/UL — SIGNIFICANT CHANGE UP (ref 1.8–7.4)
NEUTROPHILS NFR BLD AUTO: 44.7 % — SIGNIFICANT CHANGE UP (ref 43–77)
NRBC # BLD: 0 /100 WBCS — SIGNIFICANT CHANGE UP (ref 0–0)
PHOSPHATE SERPL-MCNC: 2 MG/DL — LOW (ref 2.5–4.5)
PLATELET # BLD AUTO: 265 K/UL — SIGNIFICANT CHANGE UP (ref 150–400)
POTASSIUM SERPL-MCNC: 3.6 MMOL/L — SIGNIFICANT CHANGE UP (ref 3.5–5.3)
POTASSIUM SERPL-SCNC: 3.6 MMOL/L — SIGNIFICANT CHANGE UP (ref 3.5–5.3)
RBC # BLD: 3.63 M/UL — LOW (ref 3.8–5.2)
RBC # FLD: 14.4 % — SIGNIFICANT CHANGE UP (ref 10.3–14.5)
SODIUM SERPL-SCNC: 145 MMOL/L — SIGNIFICANT CHANGE UP (ref 135–145)
SPECIMEN SOURCE: SIGNIFICANT CHANGE UP
WBC # BLD: 8.83 K/UL — SIGNIFICANT CHANGE UP (ref 3.8–10.5)
WBC # FLD AUTO: 8.83 K/UL — SIGNIFICANT CHANGE UP (ref 3.8–10.5)

## 2020-02-14 PROCEDURE — 82607 VITAMIN B-12: CPT

## 2020-02-14 PROCEDURE — 85027 COMPLETE CBC AUTOMATED: CPT

## 2020-02-14 PROCEDURE — 36415 COLL VENOUS BLD VENIPUNCTURE: CPT

## 2020-02-14 PROCEDURE — 82962 GLUCOSE BLOOD TEST: CPT

## 2020-02-14 PROCEDURE — 82746 ASSAY OF FOLIC ACID SERUM: CPT

## 2020-02-14 PROCEDURE — 96375 TX/PRO/DX INJ NEW DRUG ADDON: CPT

## 2020-02-14 PROCEDURE — 87046 STOOL CULTR AEROBIC BACT EA: CPT

## 2020-02-14 PROCEDURE — 74177 CT ABD & PELVIS W/CONTRAST: CPT

## 2020-02-14 PROCEDURE — 81001 URINALYSIS AUTO W/SCOPE: CPT

## 2020-02-14 PROCEDURE — 96374 THER/PROPH/DIAG INJ IV PUSH: CPT

## 2020-02-14 PROCEDURE — 80053 COMPREHEN METABOLIC PANEL: CPT

## 2020-02-14 PROCEDURE — 87177 OVA AND PARASITES SMEARS: CPT

## 2020-02-14 PROCEDURE — 83735 ASSAY OF MAGNESIUM: CPT

## 2020-02-14 PROCEDURE — 87493 C DIFF AMPLIFIED PROBE: CPT

## 2020-02-14 PROCEDURE — 99239 HOSP IP/OBS DSCHRG MGMT >30: CPT | Mod: GC

## 2020-02-14 PROCEDURE — 84100 ASSAY OF PHOSPHORUS: CPT

## 2020-02-14 PROCEDURE — 87507 IADNA-DNA/RNA PROBE TQ 12-25: CPT

## 2020-02-14 PROCEDURE — 83605 ASSAY OF LACTIC ACID: CPT

## 2020-02-14 PROCEDURE — 86803 HEPATITIS C AB TEST: CPT

## 2020-02-14 PROCEDURE — 80061 LIPID PANEL: CPT

## 2020-02-14 PROCEDURE — 82306 VITAMIN D 25 HYDROXY: CPT

## 2020-02-14 PROCEDURE — 80048 BASIC METABOLIC PNL TOTAL CA: CPT

## 2020-02-14 PROCEDURE — 87045 FECES CULTURE AEROBIC BACT: CPT

## 2020-02-14 PROCEDURE — 83036 HEMOGLOBIN GLYCOSYLATED A1C: CPT

## 2020-02-14 PROCEDURE — 87040 BLOOD CULTURE FOR BACTERIA: CPT

## 2020-02-14 PROCEDURE — 84443 ASSAY THYROID STIM HORMONE: CPT

## 2020-02-14 PROCEDURE — 83993 ASSAY FOR CALPROTECTIN FECAL: CPT

## 2020-02-14 PROCEDURE — 99285 EMERGENCY DEPT VISIT HI MDM: CPT | Mod: 25

## 2020-02-14 PROCEDURE — 83690 ASSAY OF LIPASE: CPT

## 2020-02-14 PROCEDURE — 93005 ELECTROCARDIOGRAM TRACING: CPT

## 2020-02-14 PROCEDURE — 84702 CHORIONIC GONADOTROPIN TEST: CPT

## 2020-02-14 RX ORDER — CHOLECALCIFEROL (VITAMIN D3) 125 MCG
1 CAPSULE ORAL
Qty: 30 | Refills: 0
Start: 2020-02-14 | End: 2020-03-14

## 2020-02-14 RX ORDER — LOVASTATIN 20 MG
1 TABLET ORAL
Qty: 0 | Refills: 0 | DISCHARGE

## 2020-02-14 RX ORDER — OLANZAPINE 15 MG/1
1 TABLET, FILM COATED ORAL
Qty: 0 | Refills: 0 | DISCHARGE

## 2020-02-14 RX ORDER — ATENOLOL 25 MG/1
1 TABLET ORAL
Qty: 0 | Refills: 0 | DISCHARGE

## 2020-02-14 RX ORDER — CIPROFLOXACIN LACTATE 400MG/40ML
1 VIAL (ML) INTRAVENOUS
Qty: 18 | Refills: 0
Start: 2020-02-14 | End: 2020-02-22

## 2020-02-14 RX ORDER — BUPROPION HYDROCHLORIDE 150 MG/1
1 TABLET, EXTENDED RELEASE ORAL
Qty: 0 | Refills: 0 | DISCHARGE

## 2020-02-14 RX ORDER — METRONIDAZOLE 500 MG
1 TABLET ORAL
Qty: 27 | Refills: 0
Start: 2020-02-14 | End: 2020-02-22

## 2020-02-14 RX ORDER — POTASSIUM PHOSPHATE, MONOBASIC POTASSIUM PHOSPHATE, DIBASIC 236; 224 MG/ML; MG/ML
15 INJECTION, SOLUTION INTRAVENOUS ONCE
Refills: 0 | Status: COMPLETED | OUTPATIENT
Start: 2020-02-14 | End: 2020-02-14

## 2020-02-14 RX ORDER — TRAZODONE HCL 50 MG
1 TABLET ORAL
Qty: 0 | Refills: 0 | DISCHARGE

## 2020-02-14 RX ADMIN — Medication 100 MILLIGRAM(S): at 06:41

## 2020-02-14 RX ADMIN — Medication 1000 UNIT(S): at 12:36

## 2020-02-14 RX ADMIN — Medication 200 MILLIGRAM(S): at 05:33

## 2020-02-14 RX ADMIN — Medication 100 MILLIGRAM(S): at 14:09

## 2020-02-14 RX ADMIN — Medication 650 MILLIGRAM(S): at 00:45

## 2020-02-14 RX ADMIN — Medication 200 MILLIGRAM(S): at 05:27

## 2020-02-14 RX ADMIN — POTASSIUM PHOSPHATE, MONOBASIC POTASSIUM PHOSPHATE, DIBASIC 62.5 MILLIMOLE(S): 236; 224 INJECTION, SOLUTION INTRAVENOUS at 09:32

## 2020-02-14 NOTE — DISCHARGE NOTE PROVIDER - NSDCPNSUBOBJ_GEN_ALL_CORE
Patient is a 54y old  Female who presents with a chief complaint of nausea, vomiting, diarrhea, abdominal pain (14 Feb 2020 08:56)        Today    patient denies any abd pain    One normal bowel movement today    tolerated full diet         REVIEW OF SYSTEMS: denies fever, chills, SOB, palpitations, chest pain, abdominal pain, nausea, vomiting, diarrhea, constipation, dizziness        MEDICATIONS  (STANDING):    cholecalciferol 1000 Unit(s) Oral daily    ciprofloxacin   IVPB 400 milliGRAM(s) IV Intermittent every 12 hours    dextrose 5% + sodium chloride 0.9% with potassium chloride 20 mEq/L 1000 milliLiter(s) (75 mL/Hr) IV Continuous <Continuous>    lactated ringers. 1000 milliLiter(s) (75 mL/Hr) IV Continuous <Continuous>    metroNIDAZOLE  IVPB 500 milliGRAM(s) IV Intermittent every 8 hours        MEDICATIONS  (PRN):    acetaminophen   Tablet .. 650 milliGRAM(s) Oral every 6 hours PRN Moderate Pain (4 - 6)    guaiFENesin   Syrup  (Sugar-Free) 200 milliGRAM(s) Oral every 6 hours PRN Cough    ondansetron Injectable 4 milliGRAM(s) IV Push every 6 hours PRN Nausea and/or Vomiting            PHYSICAL EXAM:    GENERAL: NAD    NERVOUS SYSTEM:  Alert & Oriented X3, Good concentration; Motor Strength 5/5 B/L upper and lower extremities    CHEST/LUNG: Clear to auscultation bilaterally; No rales, rhonchi, wheezing, or rubs    HEART: Regular rate and rhythm; No murmurs, rubs, or gallops    ABDOMEN: Soft, Nontender, Nondistended; Bowel sounds present    EXTREMITIES:  2+ Peripheral Pulses, No clubbing, cyanosis, or edema    SKIN: No rashes or lesions    LABS:                            10.7     8.83  )-----------( 265      ( 14 Feb 2020 05:58 )               33.6         145  |  111<H>  |  2<L>    ----------------------------<  151<H>    3.6   |  27  |  0.69            Assessment and plan:    1. Pancolitis    Cont Cipro and Flagyl PO for 14 days    Surgery consult appreciated     Stool + for Campylobacter     Counselled about low fibre diet after discharge and follow up with Gastroenterology for Colonoscopy         2. HTN    Cont Atenolol         3. H/o breast cancer s/p surgery     Stable         4. Vit D deficiency     Cont 1000U daily .        Patient is stable to be discharged today.

## 2020-02-14 NOTE — DISCHARGE NOTE NURSING/CASE MANAGEMENT/SOCIAL WORK - PATIENT PORTAL LINK FT
You can access the FollowMyHealth Patient Portal offered by [facility] by registering at the following website: [email or web address]. By joining [facility]’s FollowMyHealth portal, you will also be able to view your health information using other applications (apps) compatible with our system.

## 2020-02-14 NOTE — DISCHARGE NOTE PROVIDER - CARE PROVIDER_API CALL
Ze Mtz)  Gastroenterology; Internal Medicine  [address] [place] [id number]  Phone: [phone]  Fax: [phone]  Follow Up Time:

## 2020-02-14 NOTE — PROGRESS NOTE ADULT - REASON FOR ADMISSION
nausea, vomiting, diarrhea, abdominal pain

## 2020-02-14 NOTE — DISCHARGE NOTE PROVIDER - NSDCCPCAREPLAN_GEN_ALL_CORE_FT
PRINCIPAL DISCHARGE DIAGNOSIS  Diagnosis: Pancolitis  Assessment and Plan of Treatment:       SECONDARY DISCHARGE DIAGNOSES  Diagnosis: Vitamin D deficiency  Assessment and Plan of Treatment: Vitamin D deficiency    Diagnosis: Hypertension  Assessment and Plan of Treatment: Hypertension    Diagnosis: Hypokalemia due to excessive gastrointestinal loss of potassium  Assessment and Plan of Treatment: Hypokalemia due to excessive gastrointestinal loss of potassium PRINCIPAL DISCHARGE DIAGNOSIS  Diagnosis: Pancolitis  Assessment and Plan of Treatment: Patient came to the ED for fever, abdominal pain and diarrhea. Patient reported her symptoms started 1 week ago, initially had abdominal discomfort associated with  loose bowel movements, about 2/day which increased in frequency to 6-7/day, associated with mucus but no blood.   CT abdomen and pelvis showed pancolitis. Appendix was also thickened which was likely due to reactive inflammation. Patient was placed NPO. Started on cipro and flagyl antibiotics. Surgery consulted and recommended no interventions at this time. Blood cultures were negative. Stool culture grew Campylobacter. Treated with IVF with potassium to prevent severe hypokalemia from vomiting and diarrhea. Patient clinically improved and diet was advanced to clear liquid and then to low fiber diet. GI, Dr. Mtz consulted and recommended total of 14 days of antibiotics and for colonoscopy to be done as outpatient.   Complete your course of antibiotics with cipro 500mg twice a day and flagyl 500mg three times a day both for 9 more days. The antibiotics were sent to your pharmacy, [facility]. Recommend to follow up with GI, Dr. Mtz and undergo a colonoscopy as outpatient. Follow up with your PCP within 1 week for further evaluation and management. Recommend to have a low fiber diet.      SECONDARY DISCHARGE DIAGNOSES  Diagnosis: Vitamin D deficiency  Assessment and Plan of Treatment: You were noted to have mildly low Vitamin D level of 22.9. Normal level is between 30-80. You were started on vitamin D 1000 units daily supplementation. You are recommended to continue taking Vitamin D supplements 1000 units daily. [facility] Follow up with your PCP and recheck your vitamin D level in 4-6 weeks.    Diagnosis: Hypertension  Assessment and Plan of Treatment: Recommended to have a low salt/DASH diet. Continue with blood pressure medications. Follow up with PCP for management of hypertension.    Diagnosis: Hypokalemia due to excessive gastrointestinal loss of potassium  Assessment and Plan of Treatment: You were noted to have low potassium during admission. This was most likely due to your episodes of vomiting and diarrhea. You were treated with potassium supplementation and IV fluids with potassium. Your potassium levels returned to normal limits upon discharge. Recommend to ensure proper nutrition and hydration. Follow up with your PCP for further management.

## 2020-02-14 NOTE — DISCHARGE NOTE PROVIDER - HOSPITAL COURSE
Patient is a 53 yo female, with PMH of HTN, tachycardia, breast cancer (in remission), gastritis, anemia, and PSH of cholecystectomy, who presented to the ER for fever, abdominal pain and diarrhea. Patient reported her symptoms started 1 week ago, initially had abdominal discomfort associated with  loose bowel movements, about 2/day which increased in frequency to 6-7/day, associated with mucus but no blood.         CT abdomen and pelvis showed pancolitis. Appendix was also thickened which could have been due to reactive inflammation versus direct infectious/inflammatory involvement. Patient was placed NPO. Started on cipro and flagyl. Surgery consulted and recommended no interventions at this time. Blood cultures were negative. Stool culture grew Campylobacter. Treated with IVF with potassium to prevent severe hypokalemia from vomiting and diarrhea. Patient clinically improved and diet was advanced to clear liquid and then to low fiber diet. GI, Dr. Mtz consulted and recommended total of 14 days of antibiotics and for colonoscopy to be done as outpatient.         NOTE NOT COMPLETE Patient is a 55 yo female, with PMH of HTN, tachycardia, breast cancer (in remission), gastritis, anemia, and PSH of cholecystectomy, who presented to the ER for fever, abdominal pain and diarrhea. Patient reported her symptoms started 1 week ago, initially had abdominal discomfort associated with  loose bowel movements, about 2/day which increased in frequency to 6-7/day, associated with mucus but no blood.         CT abdomen and pelvis showed pancolitis. Appendix was also thickened which could have been due to reactive inflammation versus direct infectious/inflammatory involvement. Patient was placed NPO. Started on cipro and flagyl. Surgery consulted and recommended no interventions at this time. Blood cultures were negative. Stool culture grew Campylobacter. Treated with IVF with potassium to prevent severe hypokalemia from vomiting and diarrhea. Patient clinically improved and diet was advanced to clear liquid and then to low fiber diet. GI, Dr. Mtz consulted and recommended total of 14 days of antibiotics and for colonoscopy to be done as outpatient.         Patient is medically stable for discharge. Case was discussed with attending.

## 2020-02-14 NOTE — PROGRESS NOTE ADULT - SUBJECTIVE AND OBJECTIVE BOX
Patient is a 54y old  Female who presents with a chief complaint of nausea, vomiting, diarrhea, abdominal pain (13 Feb 2020 09:36)       Patient is a 53yo female with PMH of HTN, tachycardia (unknown), breast cancer (in remission), gastritis, anemia, s/p cholecystectomy, presented to ER for fever, abdominal pain and diarrhea. Patient reports her symptoms started 1 week ago, initially had abdominal discomfort associated with  loose bowel movements, about 2/day which increased in frequency to 6-7/day, associated with mucus but no blood. She describes her abdominal pain as severe, episodic pain, ranging from 0-8/10 in severity, mostly in the lower quadrants, no precipitating factors, relieved by advil. She reports nausea since 3 days, associated with multiple episodes of NBNB vomiting, 5 since this AM. Patient endorses fever of 101 2 days PTA associated with chills, weakness and headache. Denies recent travel, sick contacts or eating food from outside.  		    REVIEW OF SYSTEMS  Constitutional:   No fever, no fatigue, no pallor, no night sweats, no weight loss.  HEENT:   No eye pain, no vision changes, no icterus, no mouth ulcers.  Respiratory:   No shortness of breath, no cough, no respiratory distress.   Cardiovascular:   No chest pain, no palpitations.   Gastrointestinal: No abdominal pain, no nausea, no vomiting , no diahrrea, no constipation, no hematochezia,no melena.  Skin:   No rashes, no jaundice, no eczema.   Musculoskeletal:   No joint pain, no swelling, no myalgia.   Neurologic:   No headache, no seizure, no weakness.   Genitourinary:   No dysuria, no decreased urine output.  Psychiatric:  No depression, no anxiety,   Endocrine:   No thyroid disease, no diabetes.  Heme/Lymphatic:   No anemia, no blood transfusions, no lymph node enlargement, no bleeding, no bruising.  ___________________________________________________________________________________________  Allergies    honey (Short breath)  No Known Drug Allergies    Intolerances      MEDICATIONS  (STANDING):  cholecalciferol 1000 Unit(s) Oral daily  ciprofloxacin   IVPB 400 milliGRAM(s) IV Intermittent every 12 hours  dextrose 5% + sodium chloride 0.9% with potassium chloride 20 mEq/L 1000 milliLiter(s) (75 mL/Hr) IV Continuous <Continuous>  lactated ringers. 1000 milliLiter(s) (75 mL/Hr) IV Continuous <Continuous>  metroNIDAZOLE  IVPB 500 milliGRAM(s) IV Intermittent every 8 hours    MEDICATIONS  (PRN):  guaiFENesin   Syrup  (Sugar-Free) 200 milliGRAM(s) Oral every 6 hours PRN Cough  ondansetron Injectable 4 milliGRAM(s) IV Push every 6 hours PRN Nausea and/or Vomiting      PAST MEDICAL & SURGICAL HISTORY:  Gastritis  Breast cancer  Obesity  Rheumatoid arthritis  Vitamin D deficiency  Anemia: iron-deficiency  Asthma  History of cholecystectomy  [place] teeth extracted  S/P bilateral breast biopsy  History of tonsillectomy    FAMILY HISTORY:  FH: breast cancer  Family history of MI (myocardial infarction)  Family history of hypertension  Family history of diabetes mellitus    Social History: No hsitory of : Tobacco use, IVDA, EToH  ______________________________________________________________________________________    PHYSICAL EXAM    Daily     Daily   BMI: 38.7 (02-10 @ 17:28)  Change in Weight:  Vital Signs Last 24 Hrs  T(C): 36.6 (13 Feb 2020 07:38), Max: 37.7 (13 Feb 2020 00:02)  T(F): 97.8 (13 Feb 2020 07:38), Max: 99.8 (13 Feb 2020 00:02)  HR: 70 (13 Feb 2020 07:38) (66 - 90)  BP: 130/67 (13 Feb 2020 07:38) (107/64 - 130/75)  BP(mean): --  RR: 17 (13 Feb 2020 07:38) (16 - 17)  SpO2: 99% (13 Feb 2020 00:02) (98% - 99%)    General:  Well developed, well nourished, alert and active, no pallor, NAD.  HEENT:    Normal appearance of conjunctiva, ears, nose, lips, oropharynx, and oral mucosa, anicteric.  Neck:  No masses, no asymmetry.  Lymph Nodes:  No lymphadenopathy.   Cardiovascular:  RRR normal S1/S2, no murmur.  Respiratory:  CTA B/L, normal respiratory effort.   Abdominal:   soft, no masses or tenderness, normoactive BS, NT/ND, no HSM.  Extremities:   No clubbing or cyanosis, normal capillary refill, no edema.   Skin:   No rash, jaundice, lesions, eczema.   Musculoskeletal:  No joint swelling, erythema or tenderness.   Neuro: No focal deficits.   Other:   _______________________________________________________________________________________________  Lab Results:                          10.2   8.29  )-----------( 269      ( 13 Feb 2020 06:28 )             32.0     02-13    144  |  113<H>  |  3<L>  ----------------------------<  113<H>  3.6   |  24  |  0.54    Ca    7.7<L>      13 Feb 2020 06:28  Mg     2.3     02-12    TPro  5.8<L>  /  Alb  2.3<L>  /  TBili  0.2  /  DBili  x   /  AST  18  /  ALT  23  /  AlkPhos  91  02-13    LIVER FUNCTIONS - ( 13 Feb 2020 06:28 )  Alb: 2.3 g/dL / Pro: 5.8 g/dL / ALK PHOS: 91 U/L / ALT: 23 U/L DA / AST: 18 U/L / GGT: x                   Stool Results:  Culture Results:   Testing in progress (02-12 @ 17:37)  Culture Results:   Campylobacter species  DETECTED by PCR  *******Please Note:*******  GI panel PCR evaluates for:  Campylobacter, Plesiomonas shigelloides, Salmonella,  Vibrio, Yersinia enterocolitica, Enteroaggregative  Escherichia coli (EAEC), Enteropathogenic E.coli (EPEC),  Enterotoxigenic E. coli (ETEC) lt/st, Shiga-like  toxin-producing E. coli (STEC) stx1/stx2,  Shigella/ Enteroinvasive E. coli (EIEC), Cryptosporidium,  Cyclospora cayetanensis, Entamoeba histolytica,  Giardia lamblia, Adenovirus F 40/41, Astrovirus,  Norovirus GI/GII, Rotavirus A, Sapovirus (02-12 @ 17:33)    02-12 @ 17:37  Stool Culture --  Results   Testing in progress  Organism -- --    O&P  --  02-12 @ 17:33  Stool Culture --  Results   Campylobacter species  DETECTED by PCR  *******Please Note:*******  GI panel PCR evaluates for:  Campylobacter, Plesiomonas shigelloides, Salmonella,  Vibrio, Yersinia enterocolitica, Enteroaggregative  Escherichia coli (EAEC), Enteropathogenic E.coli (EPEC),  Enterotoxigenic E. coli (ETEC) lt/st, Shiga-like  toxin-producing E. coli (STEC) stx1/stx2,  Shigella/ Enteroinvasive E. coli (EIEC), Cryptosporidium,  Cyclospora cayetanensis, Entamoeba histolytica,  Giardia lamblia, Adenovirus F 40/41, Astrovirus,  Norovirus GI/GII, Rotavirus A, Sapovirus  Organism -- --    O&P  --        RADIOLOGY RESULTS:  < from: CT Abdomen and Pelvis w/ IV Cont (02.10.20 @ 19:49) >    EXAM:  CT ABDOMEN AND PELVIS IC                            PROCEDURE DATE:  02/10/2020          INTERPRETATION:  CLINICAL INFORMATION: Lower abdominal pain. Rule out colitis versus diverticulitis.    COMPARISON: August 15, 2015    PROCEDURE:   CT of the Abdomen and Pelvis was performed with intravenous contrast.   Intravenous contrast: 90 ml Omnipaque 350. 10 ml discarded.  Oral contrast: None.  Sagittal and coronal reformats were performed.    FINDINGS:    LOWER CHEST: Cardiomegaly. Status post bilateral mastectomy and breast reconstruction. Thin-walled cyst at the left lung base.    LIVER: Within normal limits.  BILE DUCTS: Normal caliber.  GALLBLADDER: Cholecystectomy.  SPLEEN: Within normal limits.  PANCREAS: Within normal limits.  ADRENALS: Within normal limits.  KIDNEYS/URETERS: Within normal limits.    BLADDER: Within normal limits.  REPRODUCTIVE ORGANS: Uterus and adnexa within normal limits.    BOWEL: Extensive colonic wall thickening, most severe in the right colon, however involving the entire colon and rectum. Appendix is also thickened which could be due to reactive inflammation versus direct infectious/inflammatory involvement.  PERITONEUM: No ascites.  VESSELS: Within normal limits.  RETROPERITONEUM/LYMPH NODES: No lymphadenopathy.    ABDOMINAL WALL: Postsurgical changes in the ventral abdominal wall.  BONES: Within normal limits.    IMPRESSION:     Pancolitis, most likely on an infectious or inflammatory basis.                         HARINI TORREZ M.D., ATTENDING RADIOLOGIST  This document has been electronically signed. Feb 10 2020  7:58PM                < end of copied text >    SURGICAL PATHOLOGY:   < from: CT Abdomen and Pelvis w/ IV Cont (02.10.20 @ 19:49) >      < end of copied text >

## 2020-02-14 NOTE — PROGRESS NOTE ADULT - ASSESSMENT
54 year old female presents with pancolitis       Colitis   Clinically Improving   Advance diet today   Complete anbx a total of 14 days (given pancolitis)  Needs a repeat interval colonoscopy as an outpatient (last colonoscopy was five years ago)  Cleared for discharge if tolerating diet   Advanced care planning was discussed with patient and family.  Advanced care planning forms were reviewed and discussed.  Risks, benefits and alternatives of gastroenterologic procedures were discussed in detail and all questions were answered.

## 2020-02-14 NOTE — DISCHARGE NOTE PROVIDER - NSDCMRMEDTOKEN_GEN_ALL_CORE_FT
atenolol 25 mg oral tablet: 1 tab(s) orally once a day  buPROPion 150 mg/12 hours (SR) oral tablet, extended release: 1 tab(s) orally once a day  lovastatin 10 mg oral tablet: 1 tab(s) orally once a day  OLANZapine 2.5 mg oral tablet: 1 tab(s) orally once a day  traZODone 100 mg oral tablet: 1 tab(s) orally once a day atenolol 25 mg oral tablet: 1 tab(s) orally once a day  buPROPion 150 mg/12 hours (SR) oral tablet, extended release: 1 tab(s) orally once a day  cholecalciferol 1000 intl units (25 mcg) oral tablet: 1 tab(s) orally once a day   ciprofloxacin 500 mg oral tablet: 1 tab(s) orally 2 times a day   Flagyl 500 mg oral tablet: 1 tab(s) orally 3 times a day   lovastatin 10 mg oral tablet: 1 tab(s) orally once a day  OLANZapine 2.5 mg oral tablet: 1 tab(s) orally once a day  traZODone 100 mg oral tablet: 1 tab(s) orally once a day

## 2020-02-16 LAB
CULTURE RESULTS: SIGNIFICANT CHANGE UP
CULTURE RESULTS: SIGNIFICANT CHANGE UP
SPECIMEN SOURCE: SIGNIFICANT CHANGE UP
SPECIMEN SOURCE: SIGNIFICANT CHANGE UP

## 2020-06-12 NOTE — PROGRESS NOTE ADULT - PROBLEM SELECTOR PLAN 6
Patient on 1:1 goals of care: full code  pt agreed with discussed plan of care doubt UTI   denies urinary symptoms  has moderate epithelial cells  monitor

## 2020-10-20 NOTE — ED ADULT NURSE NOTE - SUICIDE SCREENING QUESTION 3
10/20/20 0830   Patient Assessment/Suction   Level of Consciousness (AVPU) alert   Respiratory Effort Unlabored   Rhythm/Pattern, Respiratory unlabored;depth regular;pattern regular   PRE-TX-O2   O2 Device (Oxygen Therapy) room air   SpO2 99 %   Pulse Oximetry Type Continuous   $ Pulse Oximetry - Multiple Charge Pulse Oximetry - Multiple   Pulse 98   Resp 15   BP 98/65      No

## 2024-05-28 NOTE — PROGRESS NOTE ADULT - PROVIDER SPECIALTY LIST ADULT
Gastroenterology
Internal Medicine
Surgery
Surgery
no device, pt reaching for gonzalez rails

## 2024-08-16 NOTE — PROGRESS NOTE ADULT - PROBLEM SELECTOR PLAN 7
IMPROVE Score 0  No indication for VTE
Patient to remain FULL CODE
goals of care: full code  pt agreed with discussed plan of care  d/w attending
regular